# Patient Record
Sex: FEMALE | Race: WHITE | NOT HISPANIC OR LATINO | Employment: UNEMPLOYED | URBAN - METROPOLITAN AREA
[De-identification: names, ages, dates, MRNs, and addresses within clinical notes are randomized per-mention and may not be internally consistent; named-entity substitution may affect disease eponyms.]

---

## 2019-10-03 ENCOUNTER — OFFICE VISIT (OUTPATIENT)
Dept: URGENT CARE | Facility: CLINIC | Age: 59
End: 2019-10-03
Payer: COMMERCIAL

## 2019-10-03 VITALS
SYSTOLIC BLOOD PRESSURE: 95 MMHG | WEIGHT: 120 LBS | BODY MASS INDEX: 21.26 KG/M2 | DIASTOLIC BLOOD PRESSURE: 54 MMHG | RESPIRATION RATE: 16 BRPM | HEIGHT: 63 IN | TEMPERATURE: 97 F | HEART RATE: 61 BPM | OXYGEN SATURATION: 96 %

## 2019-10-03 DIAGNOSIS — H66.93 BILATERAL OTITIS MEDIA, UNSPECIFIED OTITIS MEDIA TYPE: Primary | ICD-10-CM

## 2019-10-03 PROCEDURE — 99204 PR OFFICE/OUTPT VISIT, NEW, LEVL IV, 45-59 MIN: ICD-10-PCS | Mod: S$GLB,,, | Performed by: NURSE PRACTITIONER

## 2019-10-03 PROCEDURE — 3008F PR BODY MASS INDEX (BMI) DOCUMENTED: ICD-10-PCS | Mod: CPTII,S$GLB,, | Performed by: NURSE PRACTITIONER

## 2019-10-03 PROCEDURE — 3008F BODY MASS INDEX DOCD: CPT | Mod: CPTII,S$GLB,, | Performed by: NURSE PRACTITIONER

## 2019-10-03 PROCEDURE — 99204 OFFICE O/P NEW MOD 45 MIN: CPT | Mod: S$GLB,,, | Performed by: NURSE PRACTITIONER

## 2019-10-03 RX ORDER — MELOXICAM 15 MG/1
15 TABLET ORAL DAILY
COMMUNITY
End: 2019-11-07 | Stop reason: SDUPTHER

## 2019-10-03 RX ORDER — CEFDINIR 300 MG/1
300 CAPSULE ORAL 2 TIMES DAILY
Qty: 20 CAPSULE | Refills: 0 | Status: SHIPPED | OUTPATIENT
Start: 2019-10-03 | End: 2019-10-13

## 2019-10-03 RX ORDER — METHYLPREDNISOLONE 4 MG/1
TABLET ORAL
Qty: 1 PACKAGE | Refills: 0 | Status: SHIPPED | OUTPATIENT
Start: 2019-10-03 | End: 2019-10-21 | Stop reason: ALTCHOICE

## 2019-10-03 NOTE — PATIENT INSTRUCTIONS
Always follow your healthcare professional's instructions.    You have received urgent care diagnosis and treatment and you may be released before all of your medical problems are known or treated. Unless you have been given a referral, you (the patient), will arrange for follow-up care as instructed.     If you have been given a referral, ez will contact you (their phone number is [phone]). If they do NOT call you by the end of the business day, please call them the following day.      Middle Ear Infection (Adult)  You have an infection of the middle ear, the space behind the eardrum. This is also called acute otitis media (AOM). Sometimes it is caused by the common cold. This is because congestion can block the internal passage (eustachian tube) that drains fluid from the middle ear. When the middle ear fills with fluid, bacteria can grow there and cause an infection. Oral antibiotics are used to treat this illness, not ear drops. Symptoms usually start to improve within 1 to 2 days of treatment.    Home care  The following are general care guidelines:  · Finish all of the antibiotic medicine given, even though you may feel better after the first few days.  · You may use over-the-counter medicine, such as acetaminophen or ibuprofen, to control pain and fever, unless something else was prescribed. If you have chronic liver or kidney disease or have ever had a stomach ulcer or gastrointestinal bleeding, talk with your healthcare provider before using these medicines. Do not give aspirin to anyone under 18 years of age who has a fever. It may cause severe illness or death.  Follow-up care  Follow up with your healthcare provider, or as advised, in 2 weeks if all symptoms have not gotten better, or if hearing doesn't go back to normal within 1 month.  When to seek medical advice  Call your healthcare provider right away if any of these occur:  · Ear pain gets worse or does not improve after 3 days of  treatment  · Unusual drowsiness or confusion  · Neck pain, stiff neck, or headache  · Fluid or blood draining from the ear canal  · Fever of 100.4°F (38°C) or as advised   · Seizure  Date Last Reviewed: 6/1/2016 © 2000-2017 [employer]. [address] [place] [id number]. All rights reserved. This information is not intended as a substitute for professional medical care. Always follow your healthcare professional's instructions.    You have been given an antibiotic to treat your condition today.  Even if your symptoms improve, please complete the medication as directed on the bottle.     Antibiotics work to destroy bacteria. They may also alter the good bacteria in your gut. Use probiotics and/or high culture yogurt about two hours apart from the antibiotic and about one week after the antibiotic to replace the good bacteria and prevent negative gastro intestinal consequences.    If you have questions about whether you should take your medications with food, you should read the medication instructions provided to you with your medication, or contact your pharmacy.    If you are female and on BCP use additional methods to prevent pregnancy while on antibiotics and for one cycle after.       Please follow up with your primary care provider within 5-7 days if your signs and symptoms have not resolved or worsen.     If your condition worsens or fails to improve we recommend that you receive another evaluation at the emergency room immediately or contact your primary care provider to discuss your concerns.     You have received urgent care diagnosis and treatment and you may be released before all of your medical problems are known or treated. Unless you have been given a referral, you (the patient), will arrange for follow-up care as instructed. If you have been given a referral, ez will contact you (there phone number is [phone])    Take the following over-the-counter medications:  Claritin, zyrtec, allegra, OR xyzal as directed, Flonase as directed for sinus congestion and postnasal drip; and If you can tolerate Benadryl at night time, this will help you to sleep and will work to dry up secretions.    Your provider discussed your plan of care with you during your physical exam. It was reviewed once more by the provider when giving you an after visit summary. If the patient is a minor, the discharge instructions were discussed with an adult and that adult acknowledge their understanding of the provider's teaching.

## 2019-10-03 NOTE — PROGRESS NOTES
"Subjective:       Patient ID: Shavon Marrero is a 59 y.o. female.    Vitals:  height is 5' 3" (1.6 m) and weight is 54.4 kg (120 lb). Her oral temperature is 96.8 °F (36 °C). Her blood pressure is 95/54 (abnormal) and her pulse is 61. Her respiration is 16 and oxygen saturation is 96%.     Chief Complaint: Tinnitus    Patient states she is having ringing in her right ear for over a month, and states she does wear a bluetooth ear piece in that ear daily.    Ringing in Ears:   Chronicity:  New  Onset:  More than 1 month ago  Progression since onset:  Gradually worsening  Frequency:  Constantly  Pain scale:  0/10  Duration:  Off/on all day   Associated symptoms: tinnitus.  No dizziness, no vertigo, no fever and no headaches.  Aggravated by:  Noise  Treatments tried:  NothingNo dizziness.      Constitution: Negative for chills, fatigue and fever.   HENT: Positive for tinnitus. Negative for congestion and sore throat.    Neck: Negative for painful lymph nodes.   Cardiovascular: Negative for chest pain and leg swelling.   Eyes: Negative for double vision and blurred vision.   Respiratory: Negative for cough and shortness of breath.    Gastrointestinal: Negative for nausea, vomiting and diarrhea.   Genitourinary: Negative for dysuria, frequency, urgency and history of kidney stones.   Musculoskeletal: Negative for joint pain, joint swelling, muscle cramps and muscle ache.   Skin: Negative for color change, pale, rash and bruising.   Allergic/Immunologic: Negative for seasonal allergies.   Neurological: Negative for dizziness, history of vertigo, light-headedness, passing out and headaches.   Hematologic/Lymphatic: Negative for swollen lymph nodes.   Psychiatric/Behavioral: Negative for nervous/anxious, sleep disturbance and depression. The patient is not nervous/anxious.        Objective:      Physical Exam   Constitutional: She is oriented to person, place, and time. She appears well-developed and well-nourished. She is " cooperative.  Non-toxic appearance. She does not appear ill. No distress.   HENT:   Head: Normocephalic and atraumatic.   Right Ear: Hearing, external ear and ear canal normal. Tympanic membrane is erythematous and bulging. A middle ear effusion is present.   Left Ear: Hearing, external ear and ear canal normal. Tympanic membrane is erythematous and bulging. A middle ear effusion is present.   Nose: Nose normal. No mucosal edema, rhinorrhea or nasal deformity. No epistaxis. Right sinus exhibits no maxillary sinus tenderness and no frontal sinus tenderness. Left sinus exhibits no maxillary sinus tenderness and no frontal sinus tenderness.   Mouth/Throat: Uvula is midline, oropharynx is clear and moist and mucous membranes are normal. No trismus in the jaw. Normal dentition. No uvula swelling. No posterior oropharyngeal erythema.   Eyes: Conjunctivae and lids are normal. Right eye exhibits no discharge. Left eye exhibits no discharge. No scleral icterus.   Neck: Trachea normal, normal range of motion, full passive range of motion without pain and phonation normal. Neck supple.   Cardiovascular: Normal rate, regular rhythm, normal heart sounds, intact distal pulses and normal pulses.   Pulmonary/Chest: Effort normal and breath sounds normal. No respiratory distress.   Abdominal: Soft. Normal appearance and bowel sounds are normal. She exhibits no distension, no pulsatile midline mass and no mass. There is no tenderness.   Musculoskeletal: Normal range of motion. She exhibits no edema or deformity.   Neurological: She is alert and oriented to person, place, and time. She exhibits normal muscle tone. Coordination normal.   Skin: Skin is warm, dry and intact. She is not diaphoretic. No pallor.   Psychiatric: She has a normal mood and affect. Her speech is normal and behavior is normal. Judgment and thought content normal. Cognition and memory are normal.   Nursing note and vitals reviewed.      Assessment:       1.  Bilateral otitis media, unspecified otitis media type        Plan:         Bilateral otitis media, unspecified otitis media type  -     cefdinir (OMNICEF) 300 MG capsule; Take 1 capsule (300 mg total) by mouth 2 (two) times daily. for 10 days  Dispense: 20 capsule; Refill: 0  -     methylPREDNISolone (MEDROL DOSEPACK) 4 mg tablet; use as directed  Dispense: 1 Package; Refill: 0          Patient Instructions   Always follow your healthcare professional's instructions.    You have received urgent care diagnosis and treatment and you may be released before all of your medical problems are known or treated. Unless you have been given a referral, you (the patient), will arrange for follow-up care as instructed.     If you have been given a referral,  will contact you (their phone number is [phone]). If they do NOT call you by the end of the business day, please call them the following day.      Middle Ear Infection (Adult)  You have an infection of the middle ear, the space behind the eardrum. This is also called acute otitis media (AOM). Sometimes it is caused by the common cold. This is because congestion can block the internal passage (eustachian tube) that drains fluid from the middle ear. When the middle ear fills with fluid, bacteria can grow there and cause an infection. Oral antibiotics are used to treat this illness, not ear drops. Symptoms usually start to improve within 1 to 2 days of treatment.    Home care  The following are general care guidelines:  · Finish all of the antibiotic medicine given, even though you may feel better after the first few days.  · You may use over-the-counter medicine, such as acetaminophen or ibuprofen, to control pain and fever, unless something else was prescribed. If you have chronic liver or kidney disease or have ever had a stomach ulcer or gastrointestinal bleeding, talk with your healthcare provider before using these medicines. Do not give aspirin to anyone  under 18 years of age who has a fever. It may cause severe illness or death.  Follow-up care  Follow up with your healthcare provider, or as advised, in 2 weeks if all symptoms have not gotten better, or if hearing doesn't go back to normal within 1 month.  When to seek medical advice  Call your healthcare provider right away if any of these occur:  · Ear pain gets worse or does not improve after 3 days of treatment  · Unusual drowsiness or confusion  · Neck pain, stiff neck, or headache  · Fluid or blood draining from the ear canal  · Fever of 100.4°F (38°C) or as advised   · Seizure  Date Last Reviewed: 6/1/2016 © 2000-2017 [employer]. [address] [place] [address], [place] [address]. All rights reserved. This information is not intended as a substitute for professional medical care. Always follow your healthcare professional's instructions.    You have been given an antibiotic to treat your condition today.  Even if your symptoms improve, please complete the medication as directed on the bottle.     Antibiotics work to destroy bacteria. They may also alter the good bacteria in your gut. Use probiotics and/or high culture yogurt about two hours apart from the antibiotic and about one week after the antibiotic to replace the good bacteria and prevent negative gastro intestinal consequences.    If you have questions about whether you should take your medications with food, you should read the medication instructions provided to you with your medication, or contact your pharmacy.    If you are female and on BCP use additional methods to prevent pregnancy while on antibiotics and for one cycle after.       Please follow up with your primary care provider within 5-7 days if your signs and symptoms have not resolved or worsen.     If your condition worsens or fails to improve we recommend that you receive another evaluation at the emergency room immediately or contact your primary care provider to discuss your  concerns.     You have received urgent care diagnosis and treatment and you may be released before all of your medical problems are known or treated. Unless you have been given a referral, you (the patient), will arrange for follow-up care as instructed. If you have been given a referral, ez will contact you (there phone number is [phone])    Take the following over-the-counter medications: Claritin, zyrtec, allegra, OR xyzal as directed, Flonase as directed for sinus congestion and postnasal drip; and If you can tolerate Benadryl at night time, this will help you to sleep and will work to dry up secretions.    Your provider discussed your plan of care with you during your physical exam. It was reviewed once more by the provider when giving you an after visit summary. If the patient is a minor, the discharge instructions were discussed with an adult and that adult acknowledge their understanding of the provider's teaching.

## 2019-10-06 ENCOUNTER — TELEPHONE (OUTPATIENT)
Dept: URGENT CARE | Facility: CLINIC | Age: 59
End: 2019-10-06

## 2019-10-06 NOTE — TELEPHONE ENCOUNTER
Patient stated that she is still having ringing in her ears. I advised the patient to finish her medication and to follow up with her pcp if her symptoms still don't resolve.

## 2019-10-21 ENCOUNTER — TELEPHONE (OUTPATIENT)
Dept: INTERNAL MEDICINE | Facility: CLINIC | Age: 59
End: 2019-10-21

## 2019-10-21 ENCOUNTER — OFFICE VISIT (OUTPATIENT)
Dept: INTERNAL MEDICINE | Facility: CLINIC | Age: 59
End: 2019-10-21
Payer: COMMERCIAL

## 2019-10-21 VITALS
WEIGHT: 117.94 LBS | OXYGEN SATURATION: 96 % | HEIGHT: 63 IN | DIASTOLIC BLOOD PRESSURE: 58 MMHG | SYSTOLIC BLOOD PRESSURE: 98 MMHG | BODY MASS INDEX: 20.9 KG/M2 | HEART RATE: 64 BPM

## 2019-10-21 DIAGNOSIS — F17.200 TOBACCO USE DISORDER: ICD-10-CM

## 2019-10-21 DIAGNOSIS — Z12.2 ENCOUNTER FOR SCREENING FOR LUNG CANCER: ICD-10-CM

## 2019-10-21 DIAGNOSIS — K21.9 GASTROESOPHAGEAL REFLUX DISEASE, ESOPHAGITIS PRESENCE NOT SPECIFIED: ICD-10-CM

## 2019-10-21 DIAGNOSIS — Z12.9 SCREENING FOR CANCER: ICD-10-CM

## 2019-10-21 DIAGNOSIS — K59.04 CHRONIC IDIOPATHIC CONSTIPATION: ICD-10-CM

## 2019-10-21 DIAGNOSIS — M54.2 CHRONIC NECK PAIN: ICD-10-CM

## 2019-10-21 DIAGNOSIS — G89.29 CHRONIC NECK PAIN: ICD-10-CM

## 2019-10-21 DIAGNOSIS — Z87.42 HISTORY OF ABNORMAL CERVICAL PAP SMEAR: ICD-10-CM

## 2019-10-21 DIAGNOSIS — J44.9 CHRONIC OBSTRUCTIVE PULMONARY DISEASE, UNSPECIFIED COPD TYPE: ICD-10-CM

## 2019-10-21 DIAGNOSIS — H93.8X3 EAR FULLNESS, BILATERAL: ICD-10-CM

## 2019-10-21 DIAGNOSIS — G43.109 MIGRAINE WITH AURA AND WITHOUT STATUS MIGRAINOSUS, NOT INTRACTABLE: Primary | ICD-10-CM

## 2019-10-21 DIAGNOSIS — J30.2 SEASONAL ALLERGIES: ICD-10-CM

## 2019-10-21 PROCEDURE — 99999 PR PBB SHADOW E&M-EST. PATIENT-LVL V: ICD-10-PCS | Mod: PBBFAC,,, | Performed by: INTERNAL MEDICINE

## 2019-10-21 PROCEDURE — 3008F BODY MASS INDEX DOCD: CPT | Mod: CPTII,S$GLB,, | Performed by: INTERNAL MEDICINE

## 2019-10-21 PROCEDURE — 99407 BEHAV CHNG SMOKING > 10 MIN: CPT | Mod: S$GLB,,, | Performed by: INTERNAL MEDICINE

## 2019-10-21 PROCEDURE — 3008F PR BODY MASS INDEX (BMI) DOCUMENTED: ICD-10-PCS | Mod: CPTII,S$GLB,, | Performed by: INTERNAL MEDICINE

## 2019-10-21 PROCEDURE — 99215 OFFICE O/P EST HI 40 MIN: CPT | Mod: S$GLB,,, | Performed by: INTERNAL MEDICINE

## 2019-10-21 PROCEDURE — 99215 PR OFFICE/OUTPT VISIT, EST, LEVL V, 40-54 MIN: ICD-10-PCS | Mod: S$GLB,,, | Performed by: INTERNAL MEDICINE

## 2019-10-21 PROCEDURE — 99999 PR PBB SHADOW E&M-EST. PATIENT-LVL V: CPT | Mod: PBBFAC,,, | Performed by: INTERNAL MEDICINE

## 2019-10-21 PROCEDURE — 99407 PR TOBACCO USE CESSATION INTENSIVE >10 MINUTES: ICD-10-PCS | Mod: S$GLB,,, | Performed by: INTERNAL MEDICINE

## 2019-10-21 RX ORDER — OXYCODONE AND ACETAMINOPHEN 10; 325 MG/1; MG/1
1 TABLET ORAL EVERY 4 HOURS PRN
COMMUNITY
End: 2020-07-02 | Stop reason: SDUPTHER

## 2019-10-21 RX ORDER — CYCLOBENZAPRINE HCL 5 MG
5 TABLET ORAL 3 TIMES DAILY PRN
COMMUNITY

## 2019-10-21 RX ORDER — MONTELUKAST SODIUM 10 MG/1
10 TABLET ORAL NIGHTLY
COMMUNITY

## 2019-10-21 RX ORDER — DEXLANSOPRAZOLE 60 MG/1
CAPSULE, DELAYED RELEASE ORAL
COMMUNITY
Start: 2019-09-12 | End: 2020-11-11

## 2019-10-21 RX ORDER — ELETRIPTAN HYDROBROMIDE 40 MG/1
TABLET, FILM COATED ORAL
COMMUNITY
Start: 2019-09-14 | End: 2019-11-25 | Stop reason: SDUPTHER

## 2019-10-21 RX ORDER — PRUCALOPRIDE 2 MG/1
TABLET, FILM COATED ORAL
COMMUNITY
Start: 2019-10-04 | End: 2020-08-17 | Stop reason: SDUPTHER

## 2019-10-21 RX ORDER — FLUTICASONE PROPIONATE 50 MCG
2 SPRAY, SUSPENSION (ML) NASAL DAILY
Qty: 3 BOTTLE | Refills: 3 | Status: SHIPPED | OUTPATIENT
Start: 2019-10-21 | End: 2020-01-19

## 2019-10-21 NOTE — PROGRESS NOTES
CHIEF COMPLAINT     Chief Complaint   Patient presents with    Establish Care   multiple chronic medical conditions: COPD, chronic neck pain, constipation, migraines    HPI   Patient here today to establish care. She has recently moved here from [place] and is in the process of establishing care. She brought 3 large binders of records to today's appt.     COPD  Takings breo BID and incruse ellipta daily. She is actively smoking. Reports that symptoms have been worse the past 2 months since making the move which she attributes to her allergies. She has been previously followed by pulm but predominantly managed by previous PCP. Last pfts within the last year and are within the file of records with her today.    Migraine HA  Reports symptoms fairly well controlled on amovig. Was having HA 5-6times week prior to initiating therapy. Currently having HA less frequently.   Reports HA are debilitating, she has auras prior to HA. She takes replax which is a fairly effective abortive agent. She also previously had PRN script for percocet for HA not treatable with replax    Chronic neck pain  Reports pain on right side of neck. She is s/p intervention x2 which were not particularly effective. She currently takes meloxicam daily and flexeril as needed. Has a special needs daughter so hasn't been able to participate in much physical treatment for her neck pain.    Shavon Marrero is a 59 y.o. female here today for Personally Reviewed Patient's Medical, surgical, family and social hx. Changes updated in [facility].  Care Team updated in Epic    Review of Systems:  Review of Systems   HENT: Positive for postnasal drip.    Respiratory: Positive for cough.    Gastrointestinal: Positive for constipation.   Musculoskeletal: Positive for arthralgias and neck pain.   otherwise negative    Health Maintenance:   Reviewed with patient  PVax   Mammogram- 5/2019  PAP- 2014- thinks she had HPV testing  C-scope 2018    PHYSICAL EXAM     BP (!)  "98/58 (BP Location: Left arm, Patient Position: Sitting, BP Method: Medium (Manual))   Pulse 64   Ht 5' 3" (1.6 m)   Wt 53.5 kg (117 lb 15.1 oz)   SpO2 96%   BMI 20.89 kg/m²     Gen: Well Appearing, NAD  HEENT: PERR, EOMI  Neck: FROM, no thyromegaly, no cervical adenopathy, has anterior and posterior scar consistent with surgical history, 4+ BL turbinate swelling. Clear TM effusions bL  CVD: RRR, no M/R/G  Pulm: Normal work of breathing, CTAB, no wheezing  Abd:  Soft, NT, ND non TTP, no mass  MSK: no LE edema, ttp R sided paraspinal muscles from neck to mid T spine.  Neuro: A&Ox3, gait jolie l, speech normal  Mood; Mood normal, behavior normal, thought process linear       LABS     Labs reviewed; ordered today    ASSESSMENT     1. Migraine with aura and without status migrainosus, not intractable  Ambulatory Referral to Neurology   2. Gastroesophageal reflux disease, esophagitis presence not specified  CBC auto differential   3. Ear fullness, bilateral     4. Chronic neck pain     5. Chronic obstructive pulmonary disease, unspecified COPD type  Comprehensive metabolic panel   6. Tobacco use disorder  Hemoglobin A1c    Lipid panel   7. Seasonal allergies  fluticasone propionate (FLONASE) 50 mcg/actuation nasal spray   8. History of abnormal cervical Pap smear  Ambulatory Referral to Gynecology   9. Chronic idiopathic constipation     10. Encounter for screening for lung cancer     11. Screening for cancer  CT Chest Without Contrast           Plan     Shavon Marrero is a 59 y.o. female with multiple chronic medical conditions. Will review outside records and have her RTC in 3 months to help facilitate her care.  25/40 minutes of  face to face visit spent counseling and coordinating care with patient.      1. Migraine with aura and without status migrainosus, not intractable  Continue current regimen of amovig ppx and  replax for abortive agent.  Will refer to neurology for help with HA s  - Ambulatory Referral to " Neurology    2. Gastroesophageal reflux disease, esophagitis presence not specified  Continue dexilant.   - CBC auto differential; Future    3. Ear fullness, bilateral  Suspect 2/2 ETD from allergic rhinitis.    Will start flonase nasal spray and see if sx improve.    4. 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   -continue prn flexeril for now.    5. Chronic obstructive pulmonary disease, unspecified COPD type  - Comprehensive metabolic panel; Future    6. Tobacco use disorder  Counseled cessation, >10 minutes.  Discussed how directly impacting COPD dx and negatively affecting HAs  - Hemoglobin A1c; Future  - Lipid panel; Future    7. Seasonal allergies  - fluticasone propionate (FLONASE) 50 mcg/actuation nasal spray; 2 sprays (100 mcg total) by Each Nostril route once daily.  Dispense: 3 Bottle; Refill: 3    8. History of abnormal cervical Pap smear  - Ambulatory Referral to Gynecology-  overdue for follow up [facility] screening    9. Chronic idiopathic constipation  Suspect medication side effects are at least contributing to symptoms.  Continue motegrity     11. Screening for cancer  >30 pack year hx, 59 years old, active smoker  - CT Chest Without Contrast; Future      Rianna Hargrove MD

## 2019-10-21 NOTE — TELEPHONE ENCOUNTER
----- Message from Liste Bhatt sent at 10/21/2019  9:18 AM CDT -----  Patient stated she will call back to make appointments for labs, neuro, gyn, and CT.

## 2019-10-21 NOTE — TELEPHONE ENCOUNTER
Note taken; pt will call us to schedule. I also told pt I would call her when I am finished with copying her medical records.    Warren Feliciano

## 2019-10-21 NOTE — TELEPHONE ENCOUNTER
Left message for pt letting her kow I finished copying her medical records and that the original copies are ready for her to .

## 2019-10-25 ENCOUNTER — TELEPHONE (OUTPATIENT)
Dept: INTERNAL MEDICINE | Facility: CLINIC | Age: 59
End: 2019-10-25

## 2019-10-25 NOTE — TELEPHONE ENCOUNTER
Corresponded with MD about this pt. She has been referred to billing and coding for further questions that they may be able to answer about her Diagnosis Code for her CT.    Warren Feliciano

## 2019-10-25 NOTE — TELEPHONE ENCOUNTER
----- Message from Francisca Quinones sent at 10/25/2019 10:06 AM CDT -----  Contact: self   Patient states she called her insurance company about the orders for her upcoming CT scan. Patient states the diagnosis code for the CT needs to be changed to a screening not a diagnostic for it to be covered 100%  by her insurance. Patient states her insurance company provided a claim #[id number] example. Patient states the diagnosis code needs to be Z87.891    Please call and advise

## 2019-10-28 ENCOUNTER — TELEPHONE (OUTPATIENT)
Dept: INTERNAL MEDICINE | Facility: CLINIC | Age: 59
End: 2019-10-28

## 2019-10-28 ENCOUNTER — PATIENT MESSAGE (OUTPATIENT)
Dept: INTERNAL MEDICINE | Facility: CLINIC | Age: 59
End: 2019-10-28

## 2019-10-28 NOTE — TELEPHONE ENCOUNTER
I spoke with Ms. Marrero regarding her concern about insurance not covering her CT scan. I gave her the number to billing as well as my directly line if she had any questions or concerns in the coming days. I also reassured her that Dr. Hargrove is working with our Billing Department as well to resolve this matter on her behalf. The pt expressed frustration, but understanding. She also mentioned that her records were still here and that her wife would be coming to pick them up tomorrow, and I told her that would be fine and that she'd just have to verify pt's name, , MD being seen, etc.    Warren Feliciano

## 2019-10-28 NOTE — TELEPHONE ENCOUNTER
Please advise as pt states her code for CT without contrast is incorrect and she'd be charged $550.00 for the test. Thanks so much!    Warren Feliciano

## 2019-10-28 NOTE — TELEPHONE ENCOUNTER
----- Message from Raissa Marrero sent at 10/28/2019 11:53 AM CDT -----  Contact: self/ [phone]  Patient says that the coding for CT CHEST WITHOUT CONTRAST  Is wrong , please ivonne and advise. Patient states that this this is to be 100% covered an patient states she is being charged 550.00

## 2019-11-05 ENCOUNTER — PATIENT MESSAGE (OUTPATIENT)
Dept: INTERNAL MEDICINE | Facility: CLINIC | Age: 59
End: 2019-11-05

## 2019-11-05 ENCOUNTER — TELEPHONE (OUTPATIENT)
Dept: INTERNAL MEDICINE | Facility: CLINIC | Age: 59
End: 2019-11-05

## 2019-11-05 NOTE — TELEPHONE ENCOUNTER
----- Message from Zulay Mcginnis sent at 11/5/2019 12:03 PM CST -----  Contact: Pt [phone]  Patient is returning a phone call.  Who left a message for the patient: Dr. Hargrove  Does patient know what this is regarding:  To discuss the results of the CT scan

## 2019-11-06 ENCOUNTER — TELEPHONE (OUTPATIENT)
Dept: INTERNAL MEDICINE | Facility: CLINIC | Age: 59
End: 2019-11-06

## 2019-11-06 NOTE — TELEPHONE ENCOUNTER
Called pt back. She is nervous and worried regarding her CT Scan results. I told her that Dr. Hargrove was not in the office today, but that I would write a note for him and remind him to call her once he gets in in the morning. She expressed understanding.     Warren Feliciano

## 2019-11-06 NOTE — TELEPHONE ENCOUNTER
----- Message from Christy Kuhn sent at 11/6/2019  1:54 PM CST -----  Contact: Pt self Mobile/Home [phone]  Patient is returning a phone call.  Who left a message for the patient:   Does patient know what this is regarding:    Comments: Patient said she received a call on yesterday and she would like a call back please.

## 2019-11-07 DIAGNOSIS — J44.9 CHRONIC OBSTRUCTIVE PULMONARY DISEASE, UNSPECIFIED COPD TYPE: ICD-10-CM

## 2019-11-07 DIAGNOSIS — K59.04 CHRONIC IDIOPATHIC CONSTIPATION: ICD-10-CM

## 2019-11-07 DIAGNOSIS — M54.2 CHRONIC NECK PAIN: ICD-10-CM

## 2019-11-07 DIAGNOSIS — G43.109 MIGRAINE WITH AURA AND WITHOUT STATUS MIGRAINOSUS, NOT INTRACTABLE: ICD-10-CM

## 2019-11-07 DIAGNOSIS — G89.29 CHRONIC NECK PAIN: ICD-10-CM

## 2019-11-07 DIAGNOSIS — K21.9 GASTROESOPHAGEAL REFLUX DISEASE, ESOPHAGITIS PRESENCE NOT SPECIFIED: ICD-10-CM

## 2019-11-07 RX ORDER — MELOXICAM 15 MG/1
15 TABLET ORAL DAILY
Qty: 90 TABLET | Refills: 3 | Status: SHIPPED | OUTPATIENT
Start: 2019-11-07 | End: 2020-11-10 | Stop reason: SDUPTHER

## 2019-11-10 ENCOUNTER — PATIENT MESSAGE (OUTPATIENT)
Dept: INTERNAL MEDICINE | Facility: CLINIC | Age: 59
End: 2019-11-10

## 2019-11-11 ENCOUNTER — TELEPHONE (OUTPATIENT)
Dept: INTERNAL MEDICINE | Facility: CLINIC | Age: 59
End: 2019-11-11

## 2019-11-11 NOTE — TELEPHONE ENCOUNTER
I spoke with MsBob Janes regarding her message earlier. She will fax us the necessary form/paperwork needed for the handicap placard she needs for her car. She also complained of ongoing ringing in her ears. She noted that she mentioned it to Dr. Hargrove previously, but it has not been addressed as readily due to the concern with her lungs. She says the ringing is keeping her up at night and it is particularly loud. Noting this to see if there can be anything done for her ears. She also says that she appriecates Dr. Hargrove personally calling her in regard to her results and concerns. She stated that she did not receive that sort of treatment with her last doctor.    Warren Feliciano

## 2019-11-25 ENCOUNTER — TELEPHONE (OUTPATIENT)
Dept: INTERNAL MEDICINE | Facility: CLINIC | Age: 59
End: 2019-11-25

## 2019-11-25 ENCOUNTER — PATIENT MESSAGE (OUTPATIENT)
Dept: INTERNAL MEDICINE | Facility: CLINIC | Age: 59
End: 2019-11-25

## 2019-11-25 DIAGNOSIS — G47.00 INSOMNIA, UNSPECIFIED TYPE: Primary | ICD-10-CM

## 2019-11-25 DIAGNOSIS — G43.109 MIGRAINE WITH AURA AND WITHOUT STATUS MIGRAINOSUS, NOT INTRACTABLE: ICD-10-CM

## 2019-11-25 RX ORDER — TEMAZEPAM 15 MG/1
CAPSULE ORAL
COMMUNITY
Start: 2019-10-24 | End: 2019-11-25 | Stop reason: SDUPTHER

## 2019-11-25 NOTE — TELEPHONE ENCOUNTER
Please advise as this appears to be happening again at another [facility]. Thanks so much!    Warren Feliciano

## 2019-11-25 NOTE — TELEPHONE ENCOUNTER
Please advise as message below states there is no refill protocol information for this order, Dr. Hargrove. Beginning to use Centralized Refill today.    Warren Feliciano

## 2019-11-25 NOTE — TELEPHONE ENCOUNTER
----- Message from Roxanne Tubbs sent at 11/25/2019 12:27 PM CST -----  Contact: LORENZO Mayorga  -[phone]   Unable to fill medication (erenumab-aooe (AIMOVIG AUTOINJECTOR) 70 mg/mL injection). System shows Dr's license not active.    Please call and advise  Thank you

## 2019-11-25 NOTE — TELEPHONE ENCOUNTER
Refill Routing Note    Medication(s) are appropriate for refill Outside of protocol      Requested Prescriptions   Pending Prescriptions Disp Refills    erenumab-aooe (AIMOVIG AUTOINJECTOR) 70 mg/mL injection 1 mL 11     Sig: Inject 1 mL (70 mg total) into the skin every 30 days.       There is no refill protocol information for this order

## 2019-11-26 RX ORDER — ELETRIPTAN HYDROBROMIDE 40 MG/1
40 TABLET, FILM COATED ORAL
Qty: 30 TABLET | Refills: 1 | Status: SHIPPED | OUTPATIENT
Start: 2019-11-26 | End: 2019-12-03

## 2019-11-26 RX ORDER — TEMAZEPAM 15 MG/1
15 CAPSULE ORAL NIGHTLY
Qty: 30 CAPSULE | Refills: 1 | Status: SHIPPED | OUTPATIENT
Start: 2019-11-26

## 2019-11-26 NOTE — TELEPHONE ENCOUNTER
Refill Routing Note    Medication(s) are appropriate for refill Outside of protocol      Requested Prescriptions   Pending Prescriptions Disp Refills    temazepam (RESTORIL) 15 mg Cap         [facility] Refill Protocol Passed - 11/26/2019  8:00 AM        Passed - Patient not pregnant        Passed - Patient seen within 3 months     Last visit with Agus Hargrove MD: 10/21/2019  Last visit in [facility]: 10/21/2019    Patient's next visit in [facility]: 11/25/2019           Passed - Med not refilled within 4 weeks      Signed Prescriptions Disp Refills    temazepam (RESTORIL) 15 mg Cap         There is no refill protocol information for this order       eletriptan (RELPAX) 40 MG tablet 30 tablet 1     Sig: Take 1 tablet (40 mg total) by mouth as needed (migraine).       Neurology:  Migraine Therapy - Triptan Passed - 11/25/2019  3:43 PM        Passed - Patient is at least 18 years old        Passed - No contraindicated diagnoses on problem list     Chest Pain  Angina  Myocardial Infarction (MI)  Ischemic Heart Disease  Heart Failure  Coronary Artery Disease              Passed - Last BP in normal range within 360 days     BP Readings from Last 3 Encounters:   10/21/19 (!) 98/58   10/03/19 (!) 95/54              Passed - Office visit in past 12 months or future 90 days     Recent Outpatient Visits            1 month ago Migraine with aura and without status migrainosus, not intractable    [facility] Agus Hargrove MD    1 month ago Bilateral otitis media, unspecified otitis media type    [facility] [place] Danita Elias NP          Future Appointments              In 1 month MD [facility] - Internal Medicine, [facility]

## 2019-11-29 ENCOUNTER — PATIENT MESSAGE (OUTPATIENT)
Dept: INTERNAL MEDICINE | Facility: CLINIC | Age: 59
End: 2019-11-29

## 2019-11-30 DIAGNOSIS — G43.109 MIGRAINE WITH AURA AND WITHOUT STATUS MIGRAINOSUS, NOT INTRACTABLE: ICD-10-CM

## 2019-11-30 DIAGNOSIS — G47.00 INSOMNIA, UNSPECIFIED TYPE: ICD-10-CM

## 2019-12-03 DIAGNOSIS — G43.109 MIGRAINE WITH AURA AND WITHOUT STATUS MIGRAINOSUS, NOT INTRACTABLE: ICD-10-CM

## 2019-12-03 RX ORDER — ELETRIPTAN HYDROBROMIDE 40 MG/1
40 TABLET, FILM COATED ORAL
Qty: 30 TABLET | Refills: 1 | OUTPATIENT
Start: 2019-12-03 | End: 2020-01-18 | Stop reason: SDUPTHER

## 2019-12-03 RX ORDER — ELETRIPTAN HYDROBROMIDE 40 MG/1
40 TABLET, FILM COATED ORAL
Qty: 30 TABLET | Refills: 1 | OUTPATIENT
Start: 2019-12-03

## 2019-12-03 RX ORDER — TEMAZEPAM 15 MG/1
15 CAPSULE ORAL NIGHTLY
Qty: 30 CAPSULE | Refills: 1 | OUTPATIENT
Start: 2019-12-03

## 2019-12-05 ENCOUNTER — PATIENT MESSAGE (OUTPATIENT)
Dept: INTERNAL MEDICINE | Facility: CLINIC | Age: 59
End: 2019-12-05

## 2019-12-06 ENCOUNTER — PATIENT MESSAGE (OUTPATIENT)
Dept: INTERNAL MEDICINE | Facility: CLINIC | Age: 59
End: 2019-12-06

## 2019-12-10 ENCOUNTER — PATIENT MESSAGE (OUTPATIENT)
Dept: INTERNAL MEDICINE | Facility: CLINIC | Age: 59
End: 2019-12-10

## 2020-01-06 ENCOUNTER — PATIENT OUTREACH (OUTPATIENT)
Dept: ADMINISTRATIVE | Facility: HOSPITAL | Age: 60
End: 2020-01-06

## 2020-01-18 DIAGNOSIS — G43.109 MIGRAINE WITH AURA AND WITHOUT STATUS MIGRAINOSUS, NOT INTRACTABLE: ICD-10-CM

## 2020-01-20 ENCOUNTER — PATIENT MESSAGE (OUTPATIENT)
Dept: INTERNAL MEDICINE | Facility: CLINIC | Age: 60
End: 2020-01-20

## 2020-01-20 RX ORDER — ELETRIPTAN HYDROBROMIDE 40 MG/1
40 TABLET, FILM COATED ORAL
Qty: 30 TABLET | Refills: 1 | OUTPATIENT
Start: 2020-01-20 | End: 2020-01-24 | Stop reason: SDUPTHER

## 2020-01-20 NOTE — TELEPHONE ENCOUNTER
Are pre-authorizations for testing a thing you do? If not, please let me know how I can handle this. Still learning :) Thanks so much!    Warren Feliciano

## 2020-01-21 DIAGNOSIS — R91.1 LUNG NODULE: Primary | ICD-10-CM

## 2020-01-21 NOTE — PROGRESS NOTES
Patient with extensive smoking history with abnl CT chest Lung Ca screen in 10/2019. New finding. Recommendation was 3 month follow up. Will order CT scan to further evaluate. Would appreciate rads input regarding benefit of delayed phase contrast to further evaluate.        Agus Moralest

## 2020-01-22 ENCOUNTER — TELEPHONE (OUTPATIENT)
Dept: INTERNAL MEDICINE | Facility: CLINIC | Age: 60
End: 2020-01-22

## 2020-01-22 NOTE — TELEPHONE ENCOUNTER
----- Message from Evelyn Uriarte sent at 1/22/2020 11:06 AM CST -----  Contact: self  Called pt about her referral for CT Scan. Pt stated that the dr office has to call and give her insurance a pre-authorization for the test.         Pt can be reached at [phone] ty

## 2020-01-23 ENCOUNTER — PATIENT MESSAGE (OUTPATIENT)
Dept: INTERNAL MEDICINE | Facility: CLINIC | Age: 60
End: 2020-01-23

## 2020-01-24 DIAGNOSIS — G43.109 MIGRAINE WITH AURA AND WITHOUT STATUS MIGRAINOSUS, NOT INTRACTABLE: ICD-10-CM

## 2020-01-24 RX ORDER — ELETRIPTAN HYDROBROMIDE 40 MG/1
40 TABLET, FILM COATED ORAL
Qty: 30 TABLET | Refills: 1 | Status: SHIPPED | OUTPATIENT
Start: 2020-01-24 | End: 2020-09-22 | Stop reason: SDUPTHER

## 2020-01-24 NOTE — TELEPHONE ENCOUNTER
Prior authorization for CT scan has been submitted, also faxed over all clinical information they have requested. Case# [id number] they said they will contact pt to verify some information.

## 2020-01-24 NOTE — PROGRESS NOTES
Refill Authorization Note     is requesting a refill authorization.    Brief assessment and rationale for refill: APPROVE: alternate method          Medication Therapy Plan: PCP has script set to print(1/20); [facility]                              Comments:   Requested Prescriptions   Pending Prescriptions Disp Refills    eletriptan (RELPAX) 40 MG tablet 30 tablet 1     Sig: Take 1 tablet (40 mg total) by mouth as needed (migraine). Can take a second dose if symptoms not improved 2 hours after first dose. 80mg maximum daily dose       Neurology:  Migraine Therapy - Triptan Passed - 1/24/2020  7:30 AM        Passed - Patient is at least 18 years old        Passed - No contraindicated diagnoses on problem list     Chest Pain  Angina  Myocardial Infarction (MI)  Ischemic Heart Disease  Heart Failure  Coronary Artery Disease              Passed - Last BP in normal range within 360 days     BP Readings from Last 3 Encounters:   10/21/19 (!) 98/58   10/03/19 (!) 95/54              Passed - Office visit in past 12 months or future 90 days     Recent Outpatient Visits            3 months ago Migraine with aura and without status migrainosus, not intractable    [facility] Agus Hargrove MD    3 months ago Bilateral otitis media, unspecified otitis media type    [facility] [place] Danita Elias NP

## 2020-01-24 NOTE — TELEPHONE ENCOUNTER
Pt messaging in reference to her Rx and wondering if it's been sent to the pharmacy. Her original message was 6 days ago. Please advise. Thanks so much!    Warren Feliciano

## 2020-01-24 NOTE — TELEPHONE ENCOUNTER
I have reviewed and agree with the assessment below. Approved 30 + 1 refill to reflect order by PCP. Thank you.

## 2020-01-24 NOTE — TELEPHONE ENCOUNTER
Called Ms. Sands to answer her questions.    Concerned about CT pre-authorization. I told her we were in the process of getting that done now.    Breo and Incruise-- pt wanting to do a three-in-one version rather than having to do both separately. Better price for pt. Time for renewing Rx anyway.    Pt concerned as to why she hasn't gotten her migraine Rx (in message I replied to earlier) though she messaged us almost 1 week ago about it. I reassured her that I was personally on it as I didn't see why her Rx was not filled soon after I sent the refill request to the refill staff pool. I told her I sent them another message about it prior to calling her.    She expressed understanding and thanks to our correspondence.     Warren Feliciano

## 2020-01-26 ENCOUNTER — OFFICE VISIT (OUTPATIENT)
Dept: URGENT CARE | Facility: CLINIC | Age: 60
End: 2020-01-26
Payer: COMMERCIAL

## 2020-01-26 VITALS
TEMPERATURE: 98 F | WEIGHT: 117 LBS | SYSTOLIC BLOOD PRESSURE: 102 MMHG | DIASTOLIC BLOOD PRESSURE: 58 MMHG | OXYGEN SATURATION: 99 % | HEART RATE: 79 BPM | HEIGHT: 63 IN | BODY MASS INDEX: 20.73 KG/M2 | RESPIRATION RATE: 18 BRPM

## 2020-01-26 DIAGNOSIS — K64.9 HEMORRHOIDS, UNSPECIFIED HEMORRHOID TYPE: Primary | ICD-10-CM

## 2020-01-26 PROCEDURE — 99214 OFFICE O/P EST MOD 30 MIN: CPT | Mod: S$GLB,,, | Performed by: NURSE PRACTITIONER

## 2020-01-26 PROCEDURE — 99214 PR OFFICE/OUTPT VISIT, EST, LEVL IV, 30-39 MIN: ICD-10-PCS | Mod: S$GLB,,, | Performed by: NURSE PRACTITIONER

## 2020-01-26 NOTE — PROGRESS NOTES
"Subjective:       Patient ID: Shavon Marrero is a 59 y.o. female.    Vitals:  height is 5' 3" (1.6 m) and weight is 53.1 kg (117 lb). Her temperature is 97.7 °F (36.5 °C). Her blood pressure is 102/58 (abnormal) and her pulse is 79. Her respiration is 18 and oxygen saturation is 99%.     Chief Complaint: Rectal Bleeding    This is a 59 y.o. female who presents today with a chief complaint of rectal bleeding that started last night.      Rectal Bleeding   This is a new problem. The current episode started yesterday. The problem occurs constantly. The problem has been gradually worsening. Pertinent negatives include no arthralgias, chest pain, chills, congestion, coughing, fatigue, fever, headaches, joint swelling, myalgias, nausea, rash, sore throat, vertigo, vomiting or weakness. She has tried nothing for the symptoms.       Constitution: Negative for chills, fatigue and fever.   HENT: Negative for congestion and sore throat.    Neck: Negative for painful lymph nodes.   Cardiovascular: Negative for chest pain and leg swelling.   Eyes: Negative for double vision and blurred vision.   Respiratory: Negative for cough and shortness of breath.    Gastrointestinal: Positive for bright red blood in stool and rectal bleeding. Negative for nausea, vomiting, diarrhea and rectal pain.   Genitourinary: Negative for dysuria, frequency, urgency and history of kidney stones.   Musculoskeletal: Negative for joint pain, joint swelling, muscle cramps and muscle ache.   Skin: Negative for color change, pale, rash and bruising.   Allergic/Immunologic: Negative for seasonal allergies.   Neurological: Negative for dizziness, history of vertigo, light-headedness, passing out and headaches.   Hematologic/Lymphatic: Negative for swollen lymph nodes.   Psychiatric/Behavioral: Negative for nervous/anxious, sleep disturbance and depression. The patient is not nervous/anxious.        Objective:      Physical Exam   Constitutional: She is " oriented to person, place, and time. She appears well-developed and well-nourished. She is active.   HENT:   Head: Normocephalic and atraumatic.   Right Ear: External ear normal.   Left Ear: External ear normal.   Nose: Nose normal.   Eyes: Conjunctivae and lids are normal.   Neck: Trachea normal and full passive range of motion without pain. Neck supple.   Cardiovascular: Normal rate, regular rhythm and normal heart sounds.   Pulmonary/Chest: Effort normal and breath sounds normal. No respiratory distress.   Abdominal: Soft. Normal appearance and bowel sounds are normal. She exhibits no distension, no abdominal bruit, no pulsatile midline mass and no mass. There is no hepatosplenomegaly, splenomegaly or hepatomegaly. There is no tenderness. There is no rigidity, no rebound, no guarding, no CVA tenderness, no tenderness at McBurney's point and negative Prasad's sign.   Genitourinary:         Musculoskeletal: Normal range of motion. She exhibits no edema.   Neurological: She is alert and oriented to person, place, and time. She has normal strength. She is not disoriented.   Skin: Skin is warm, dry, intact, not diaphoretic and not pale.   Psychiatric: She has a normal mood and affect. Her speech is normal and behavior is normal. Judgment and thought content normal. Cognition and memory are normal.   Nursing note and vitals reviewed.        Assessment:       1. Hemorrhoids, unspecified hemorrhoid type        Plan:         Hemorrhoids, unspecified hemorrhoid type      Patient Instructions   Please follow up with your Primary care provider within 2-5 days if your signs and symptoms have not resolved or worsen.     If your condition worsens or fails to improve we recommend that you receive another evaluation at the emergency room immediately or contact your primary medical clinic to discuss your concerns.    You must understand that you have received an Urgent Care treatment only and that you may be released before all of  your medical problems are known or treated.   You, the patient, will arrange for follow up care as instructed.         Hemorrhoids    Hemorrhoids are swollen and inflamed veins inside the rectum and near the anus. The rectum is the last several inches of the colon. The anus is the passage between the rectum and the outside of the body.  Causes  The veins can become swollen due to increased pressure in them. This is most often caused by:  · Chronic constipation or diarrhea  · Straining when having a bowel movement  · Sitting too long on the toilet  · A low-fiber diet  · Pregnancy  Symptoms  · Bleeding from the rectum (this may be noticeable after bowel movements)  · Lump near the anus  · Itching around the anus  · Pain around the anus  There are different types of hemorrhoids. Depending on the type you have and the severity, you may be able to treat yourself at home. In some cases, a procedure may be the best treatment option. Your healthcare provider can tell you more about this, if needed.  Home care  General care  · To get relief from pain or itching, try:  ¨ Topical products. Your healthcare provider may prescribe or recommend creams, ointments, or pads that can be applied to the hemorrhoid. Use these exactly as directed.  ¨ Medicines. Your healthcare provider may recommend stool softeners, suppositories, or laxatives to help manage constipation. Use these exactly as directed.  ¨ Sitz baths. A sitz bath involves sitting in a few inches of warm bath water. Be careful not to make the water so hot that you burn yourself--test it before sitting in it. Soak for about 10 to 15 minutes a few times a day. This may help relieve pain.  Tips to help prevent hemorrhoids  · Eat more fiber. Fiber adds bulk to stool and absorbs water as it moves through your colon. This makes stool softer and easier to pass.  ¨ Increase the fiber in your diet with more fiber-rich foods. These include fresh fruit, vegetables, and whole  grains.  ¨ Take a fiber supplement or bulking agent, if advised to by your provider. These include products such as psyllium or methylcellulose.  · Drink plenty of water, if directed to by your provider. This can help keep stool soft.  · Be more active. Frequent exercise aids digestion and helps prevent constipation. It may also help make bowel movements more regular.  · Dont strain during bowel movements. This can make hemorrhoids more likely. Also, dont sit on the toilet for long periods of time.  Follow-up care  Follow up with your healthcare provider, or as advised. If a culture or imaging tests were done, you will be notified of the results when they are ready. This may take a few days or longer.  When to seek medical advice  Call your healthcare provider right away if any of these occur:  · Increased bleeding from the rectum  · Increased pain around the rectum or anus  · Weakness or dizziness  Call 911  Call 911 or return to the emergency department right away if any of these occur:  · Trouble breathing or swallowing  · Fainting or loss of consciousness  · Unusually fast heart rate  · Vomiting blood  · Large amounts of blood in stool  Date Last Reviewed: 6/22/2015  © 1543-2545 [employer]. [address] [place] [address], [place] [address]. All rights reserved. This information is not intended as a substitute for professional medical care. Always follow your healthcare professional's instructions.

## 2020-01-26 NOTE — PATIENT INSTRUCTIONS
Please follow up with your Primary care provider within 2-5 days if your signs and symptoms have not resolved or worsen.     If your condition worsens or fails to improve we recommend that you receive another evaluation at the emergency room immediately or contact your primary medical clinic to discuss your concerns.    You must understand that you have received an Urgent Care treatment only and that you may be released before all of your medical problems are known or treated.   You, the patient, will arrange for follow up care as instructed.         Hemorrhoids    Hemorrhoids are swollen and inflamed veins inside the rectum and near the anus. The rectum is the last several inches of the colon. The anus is the passage between the rectum and the outside of the body.  Causes  The veins can become swollen due to increased pressure in them. This is most often caused by:  · Chronic constipation or diarrhea  · Straining when having a bowel movement  · Sitting too long on the toilet  · A low-fiber diet  · Pregnancy  Symptoms  · Bleeding from the rectum (this may be noticeable after bowel movements)  · Lump near the anus  · Itching around the anus  · Pain around the anus  There are different types of hemorrhoids. Depending on the type you have and the severity, you may be able to treat yourself at home. In some cases, a procedure may be the best treatment option. Your healthcare provider can tell you more about this, if needed.  Home care  General care  · To get relief from pain or itching, try:  ¨ Topical products. Your healthcare provider may prescribe or recommend creams, ointments, or pads that can be applied to the hemorrhoid. Use these exactly as directed.  ¨ Medicines. Your healthcare provider may recommend stool softeners, suppositories, or laxatives to help manage constipation. Use these exactly as directed.  ¨ Sitz baths. A sitz bath involves sitting in a few inches of warm bath water. Be careful not to make the  water so hot that you burn yourself--test it before sitting in it. Soak for about 10 to 15 minutes a few times a day. This may help relieve pain.  Tips to help prevent hemorrhoids  · Eat more fiber. Fiber adds bulk to stool and absorbs water as it moves through your colon. This makes stool softer and easier to pass.  ¨ Increase the fiber in your diet with more fiber-rich foods. These include fresh fruit, vegetables, and whole grains.  ¨ Take a fiber supplement or bulking agent, if advised to by your provider. These include products such as psyllium or methylcellulose.  · Drink plenty of water, if directed to by your provider. This can help keep stool soft.  · Be more active. Frequent exercise aids digestion and helps prevent constipation. It may also help make bowel movements more regular.  · Dont strain during bowel movements. This can make hemorrhoids more likely. Also, dont sit on the toilet for long periods of time.  Follow-up care  Follow up with your healthcare provider, or as advised. If a culture or imaging tests were done, you will be notified of the results when they are ready. This may take a few days or longer.  When to seek medical advice  Call your healthcare provider right away if any of these occur:  · Increased bleeding from the rectum  · Increased pain around the rectum or anus  · Weakness or dizziness  Call 911  Call 911 or return to the emergency department right away if any of these occur:  · Trouble breathing or swallowing  · Fainting or loss of consciousness  · Unusually fast heart rate  · Vomiting blood  · Large amounts of blood in stool  Date Last Reviewed: 6/22/2015  © 6304-7223 [employer]. [address] [place] [address], [place] [address]. All rights reserved. This information is not intended as a substitute for professional medical care. Always follow your healthcare professional's instructions.

## 2020-01-27 ENCOUNTER — PATIENT MESSAGE (OUTPATIENT)
Dept: INTERNAL MEDICINE | Facility: CLINIC | Age: 60
End: 2020-01-27

## 2020-01-27 DIAGNOSIS — J44.9 CHRONIC OBSTRUCTIVE PULMONARY DISEASE, UNSPECIFIED COPD TYPE: Primary | ICD-10-CM

## 2020-01-28 ENCOUNTER — TELEPHONE (OUTPATIENT)
Dept: INTERNAL MEDICINE | Facility: CLINIC | Age: 60
End: 2020-01-28

## 2020-01-28 NOTE — TELEPHONE ENCOUNTER
----- Message from Jerry [place] sent at 1/28/2020  4:23 PM CST -----  Contact: Ileana [facility]   Ileana state the CT orders need to be fax to . Please advise.

## 2020-01-28 NOTE — TELEPHONE ENCOUNTER
Called pt regarding CT being sent to [facility]. I repeatedly informed her that Dr. Hargrove is concerned with how to get the images thereafter as well as the report as we had trouble getting CT images from a disc previously provided by this pt.     She expressed her frustration with waiting and saying that Dr. Hargrove is not telling her anything regarding what he thinks the issue is and that she's been waiting 3 months for this test which has her worried, having nightmares, and bringing up concerns about her daughter and who will care for her if she dies as the pt believes this may be cancer.    I reassured her that she can get the test done wherever she wants. I told her I was going to send the order over now, and when she gets the test done, please get a copy of the images as well as the report to bring here to the office.     She calmed down and expressed understanding and thanks.    Warren Feliciano

## 2020-01-28 NOTE — TELEPHONE ENCOUNTER
----- Message from Paris [place] sent at 1/28/2020 12:22 PM CST -----  Contact: David [phone]  Requesting orders for a CAT scan of the chest to be faxed to [phone].    Please call and advise.    Thank You

## 2020-01-28 NOTE — TELEPHONE ENCOUNTER
Spoke with pt on the phone; please see last msg.    P.S. to last msg: She also expressed how much cheaper the CT would be for her at [facility] versus here at [facility]. I told her I understood her decision to get the test done elsewhere as the price difference was significant.    Warren Feliciano

## 2020-01-29 ENCOUNTER — TELEPHONE (OUTPATIENT)
Dept: URGENT CARE | Facility: CLINIC | Age: 60
End: 2020-01-29

## 2020-02-03 ENCOUNTER — PATIENT MESSAGE (OUTPATIENT)
Dept: INTERNAL MEDICINE | Facility: CLINIC | Age: 60
End: 2020-02-03

## 2020-02-10 ENCOUNTER — PATIENT MESSAGE (OUTPATIENT)
Dept: INTERNAL MEDICINE | Facility: CLINIC | Age: 60
End: 2020-02-10

## 2020-02-10 NOTE — TELEPHONE ENCOUNTER
Dr. Hargrove saw the results I left on his desk this morning when he came back into office.    Warren Feliciano

## 2020-02-26 ENCOUNTER — PATIENT MESSAGE (OUTPATIENT)
Dept: INTERNAL MEDICINE | Facility: CLINIC | Age: 60
End: 2020-02-26

## 2020-03-05 ENCOUNTER — PATIENT MESSAGE (OUTPATIENT)
Dept: INTERNAL MEDICINE | Facility: CLINIC | Age: 60
End: 2020-03-05

## 2020-03-05 DIAGNOSIS — J44.9 CHRONIC OBSTRUCTIVE PULMONARY DISEASE, UNSPECIFIED COPD TYPE: Primary | ICD-10-CM

## 2020-03-06 RX ORDER — ALBUTEROL SULFATE 1.25 MG/3ML
1.25 SOLUTION RESPIRATORY (INHALATION) EVERY 6 HOURS PRN
Qty: 3 BOX | Refills: 3 | Status: SHIPPED | OUTPATIENT
Start: 2020-03-06 | End: 2020-03-11 | Stop reason: SDUPTHER

## 2020-03-10 ENCOUNTER — PATIENT MESSAGE (OUTPATIENT)
Dept: INTERNAL MEDICINE | Facility: CLINIC | Age: 60
End: 2020-03-10

## 2020-03-11 ENCOUNTER — PATIENT MESSAGE (OUTPATIENT)
Dept: INTERNAL MEDICINE | Facility: CLINIC | Age: 60
End: 2020-03-11

## 2020-03-11 DIAGNOSIS — J44.9 CHRONIC OBSTRUCTIVE PULMONARY DISEASE, UNSPECIFIED COPD TYPE: ICD-10-CM

## 2020-03-11 RX ORDER — ALBUTEROL SULFATE 1.25 MG/3ML
1.25 SOLUTION RESPIRATORY (INHALATION) EVERY 6 HOURS PRN
Qty: 3 BOX | Refills: 3 | Status: SHIPPED | OUTPATIENT
Start: 2020-03-11 | End: 2021-03-11

## 2020-03-31 ENCOUNTER — PATIENT MESSAGE (OUTPATIENT)
Dept: INTERNAL MEDICINE | Facility: CLINIC | Age: 60
End: 2020-03-31

## 2020-04-01 NOTE — TELEPHONE ENCOUNTER
Wife works for [employer] so is still going to work  She has worries about her wife being an asx carrier  Discussed this with her today  Discussed all precautions  She was grateful for the call

## 2020-04-06 ENCOUNTER — PATIENT MESSAGE (OUTPATIENT)
Dept: INTERNAL MEDICINE | Facility: CLINIC | Age: 60
End: 2020-04-06

## 2020-04-08 ENCOUNTER — PATIENT MESSAGE (OUTPATIENT)
Dept: INTERNAL MEDICINE | Facility: CLINIC | Age: 60
End: 2020-04-08

## 2020-04-08 DIAGNOSIS — G43.109 MIGRAINE WITH AURA AND WITHOUT STATUS MIGRAINOSUS, NOT INTRACTABLE: ICD-10-CM

## 2020-04-14 ENCOUNTER — PATIENT MESSAGE (OUTPATIENT)
Dept: INTERNAL MEDICINE | Facility: CLINIC | Age: 60
End: 2020-04-14

## 2020-04-14 NOTE — TELEPHONE ENCOUNTER
We can get a higher dose of fluticasone (200mcg bs 100mcg) on breo and incruse separately. The other components should be the same. Does she want to switch back to breo and incruse?

## 2020-04-14 NOTE — TELEPHONE ENCOUNTER
Called pt as she had a Trelogy dosage concern. She said that it was not as strong of a dosage of medication as she is used to getting. She was on 3 separate inhalers and now is using this one, but the Trelogy only comes in one dosage and is not helping her as she feels it should. She would like to know what to do for next steps. Please advise.    Wraren Feliciano

## 2020-04-15 ENCOUNTER — PATIENT MESSAGE (OUTPATIENT)
Dept: INTERNAL MEDICINE | Facility: CLINIC | Age: 60
End: 2020-04-15

## 2020-04-15 DIAGNOSIS — J44.9 CHRONIC OBSTRUCTIVE PULMONARY DISEASE, UNSPECIFIED COPD TYPE: Primary | ICD-10-CM

## 2020-04-15 RX ORDER — FLUTICASONE FUROATE AND VILANTEROL 200; 25 UG/1; UG/1
1 POWDER RESPIRATORY (INHALATION) DAILY
Qty: 60 EACH | Refills: 2 | Status: SHIPPED | OUTPATIENT
Start: 2020-04-15 | End: 2020-07-13

## 2020-04-25 ENCOUNTER — PATIENT MESSAGE (OUTPATIENT)
Dept: INTERNAL MEDICINE | Facility: CLINIC | Age: 60
End: 2020-04-25

## 2020-07-02 ENCOUNTER — OFFICE VISIT (OUTPATIENT)
Dept: INTERNAL MEDICINE | Facility: CLINIC | Age: 60
End: 2020-07-02
Payer: COMMERCIAL

## 2020-07-02 DIAGNOSIS — G43.109 MIGRAINE WITH AURA AND WITHOUT STATUS MIGRAINOSUS, NOT INTRACTABLE: ICD-10-CM

## 2020-07-02 DIAGNOSIS — G43.909 MIGRAINE WITHOUT STATUS MIGRAINOSUS, NOT INTRACTABLE, UNSPECIFIED MIGRAINE TYPE: Primary | ICD-10-CM

## 2020-07-02 PROCEDURE — 99441 PR PHYSICIAN TELEPHONE EVALUATION 5-10 MIN: CPT | Mod: 95,,, | Performed by: INTERNAL MEDICINE

## 2020-07-02 PROCEDURE — 99441 PR PHYSICIAN TELEPHONE EVALUATION 5-10 MIN: ICD-10-PCS | Mod: 95,,, | Performed by: INTERNAL MEDICINE

## 2020-07-02 RX ORDER — OXYCODONE AND ACETAMINOPHEN 10; 325 MG/1; MG/1
1 TABLET ORAL 3 TIMES DAILY PRN
Qty: 21 TABLET | Refills: 0 | Status: SHIPPED | OUTPATIENT
Start: 2020-07-02 | End: 2021-02-05 | Stop reason: SDUPTHER

## 2020-07-02 NOTE — PROGRESS NOTES
Established Patient - Audio Only Telehealth Visit     The patient location is: home, [facility]  The chief complaint leading to consultation is: advice  Visit type: Virtual visit with audio only (telephone)  Total time spent with patient: 5       The reason for the audio only service rather than synchronous audio and video virtual visit was related to technical difficulties or patient preference/necessity.     Each patient to whom I provide medical services by telemedicine is:  (1) informed of the relationship between the physician and patient and the respective role of any other health care provider with respect to management of the patient; and (2) notified that they may decline to receive medical services by telemedicine and may withdraw from such care at any time. Patient verbally consented to receive this service via voice-only telephone call.       HPI:   Patient is scheduled to have her disability exam done and is concerned about risk of going into office during COVID.  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    Patient also asking for medication refills.     Assessment and plan:      1. Covid concern  Recommend patient contact doctor's office and see what precautions they are taking.  Told patient if they are not screening for symptoms on arrival, do not have strategies to help patient's social distance at the office and not requiring universal mask use, I would recommend that she reschedule or find another provider due to her risk of complications if she were developed COVID.  Explained even with all precaution it is still not a 0 risk event.    Migraine  Patient occasionally has to take single Percocet S salvage therapy for migraines not relieved by her other strategies.  Reports this happens 1 or 2 times a year.  She has not had her prescription refilled since she relocated from [place]  last August.  Discussed with patient I do not like co-prescribing opioids and benzodiazepines due to risk of increase sedation.  However patient takes approximately 1 opioid every 2-3 months and uses her p.r.n. temazepam sleep medication approximately once or twice a month.  Counseled extensively on risk of taking both at same time and to avoid under all circumstances.  Patient voices understanding.

## 2020-07-12 DIAGNOSIS — J44.9 CHRONIC OBSTRUCTIVE PULMONARY DISEASE, UNSPECIFIED COPD TYPE: ICD-10-CM

## 2020-07-13 DIAGNOSIS — J44.9 CHRONIC OBSTRUCTIVE PULMONARY DISEASE, UNSPECIFIED COPD TYPE: ICD-10-CM

## 2020-07-13 RX ORDER — FLUTICASONE FUROATE AND VILANTEROL TRIFENATATE 200; 25 UG/1; UG/1
POWDER RESPIRATORY (INHALATION)
Qty: 180 EACH | Refills: 3 | Status: SHIPPED | OUTPATIENT
Start: 2020-07-13

## 2020-07-13 RX ORDER — UMECLIDINIUM 62.5 UG/1
AEROSOL, POWDER ORAL
Qty: 90 EACH | Refills: 3 | Status: SHIPPED | OUTPATIENT
Start: 2020-07-13

## 2020-07-13 NOTE — PROGRESS NOTES
Refill Authorization Note    is requesting a refill authorization.    Brief assessment and rationale for refill: APPROVE: prr          Medication Therapy Plan: approve 12 more    Medication reconciliation completed: No                         Comments:      Requested Prescriptions   Signed Prescriptions Disp Refills    INCRUSE ELLIPTA 62.5 mcg/actuation inhalation capsule 90 each 3     Sig: INHALE 1 CAPSULE (63 MCG TOTAL) INTO THE LUNGS ONCE DAILY.       Pulmonology:  Anticholinergic Agents Passed - 7/13/2020 12:17 AM        Passed - Patient is at least 18 years old        Passed - Patient has asthma or COPD and there is a controller medication on the active medication list        Passed - Office visit in past 12 months or future 90 days.     Recent Outpatient Visits            1 week ago Migraine without status migrainosus, not intractable, unspecified migraine type    [facility] Agus Hargrove MD    5 months ago Hemorrhoids, unspecified hemorrhoid type    [facility] [place] NP    8 months ago Migraine with aura and without status migrainosus, not intractable    [facility] Agus Hargrove MD    9 months ago Bilateral otitis media, unspecified otitis media type    [facility] [place] Elias NP          Future Appointments              Tomorrow MD [facility] - [facility] Medicine, [facility]                    Appointments  past 12m or future 3m with PCP    Date Provider   Last Visit   7/2/2020 Agus Hargrove MD   Next Visit   7/14/2020 Agus Hargrove MD   ED visits in past 90 days: 1     Note composed:3:16 PM 07/13/2020

## 2020-07-13 NOTE — PROGRESS NOTES
Refill Authorization Note    is requesting a refill authorization.    Brief assessment and rationale for refill: APPROVE: prr          Medication Therapy Plan: approve 12 more    Medication reconciliation completed: No                         Comments:      Requested Prescriptions   Signed Prescriptions Disp Refills    BREO ELLIPTA 200-25 mcg/dose DsDv diskus inhaler 180 each 3     Sig: INHALE 1 PUFF INTO THE LUNGS ONCE DAILY       There is no refill protocol information for this order         Appointments  past 12m or future 3m with PCP    Date Provider   Last Visit   Visit date not found Jazzmine Lo MD   Next Visit   7/13/2020 Jazzmine Lo MD   ED visits in past 90 days: 1     Note composed:1:48 PM 07/13/2020

## 2020-08-17 ENCOUNTER — PATIENT MESSAGE (OUTPATIENT)
Dept: INTERNAL MEDICINE | Facility: CLINIC | Age: 60
End: 2020-08-17

## 2020-08-17 DIAGNOSIS — K59.04 CHRONIC IDIOPATHIC CONSTIPATION: ICD-10-CM

## 2020-08-17 RX ORDER — PRUCALOPRIDE 2 MG/1
2 TABLET, FILM COATED ORAL DAILY
Qty: 90 TABLET | Refills: 3 | Status: SHIPPED | OUTPATIENT
Start: 2020-08-17 | End: 2020-11-15

## 2020-09-02 ENCOUNTER — PATIENT MESSAGE (OUTPATIENT)
Dept: INTERNAL MEDICINE | Facility: CLINIC | Age: 60
End: 2020-09-02

## 2020-09-11 NOTE — TELEPHONE ENCOUNTER
Prior authorization done via [email or web address] please allow up to 72 hours for response. Thanks

## 2020-09-14 ENCOUNTER — PATIENT MESSAGE (OUTPATIENT)
Dept: INTERNAL MEDICINE | Facility: CLINIC | Age: 60
End: 2020-09-14

## 2020-09-23 ENCOUNTER — PATIENT MESSAGE (OUTPATIENT)
Dept: INTERNAL MEDICINE | Facility: CLINIC | Age: 60
End: 2020-09-23

## 2020-09-23 DIAGNOSIS — G43.109 MIGRAINE WITH AURA AND WITHOUT STATUS MIGRAINOSUS, NOT INTRACTABLE: ICD-10-CM

## 2020-09-23 RX ORDER — ELETRIPTAN HYDROBROMIDE 40 MG/1
40 TABLET, FILM COATED ORAL
Qty: 30 TABLET | Refills: 1 | Status: SHIPPED | OUTPATIENT
Start: 2020-09-23

## 2020-09-23 NOTE — TELEPHONE ENCOUNTER
No new care gaps identified.  Powered by [employer]. Reference number: [id number]. 9/23/2020 4:03:59 PM CDT

## 2020-09-30 ENCOUNTER — NURSE TRIAGE (OUTPATIENT)
Dept: ADMINISTRATIVE | Facility: CLINIC | Age: 60
End: 2020-09-30

## 2020-09-30 NOTE — TELEPHONE ENCOUNTER
Spoke with patient she stated that she received a pneumonia and shingles vaccine yesterday.  States her current symptoms are fever, feeling fatigued, and cough.  Current temp 100.8.  Denies having any difficulty breathing.  Advised patient to be seen within the next 3-4 hours.  Patient verbalized understanding.     Reason for Disposition   [1] Fever > 100.0 F (37.8 C) AND [2] diabetes mellitus or weak immune system (e.g., HIV positive, cancer chemo, splenectomy, organ transplant, chronic steroids)    Additional Information   Negative: Shock suspected (e.g., cold/pale/clammy skin, too weak to stand, low BP, rapid pulse)   Negative: Difficult to awaken or acting confused (e.g., disoriented, slurred speech)   Negative: [1] Difficulty breathing AND [2] bluish lips, tongue or face   Negative: New onset rash with multiple purple (or blood-colored) spots or dots   Negative: Sounds like a life-threatening emergency to the triager   Negative: [1] Headache AND [2] stiff neck (can't touch chin to chest)   Negative: Difficulty breathing   Negative: IV drug abuse   Negative: [1] Drinking very little AND [2] dehydration suspected (e.g., no urine > 12 hours, very dry mouth, very lightheaded)   Negative: Patient sounds very sick or weak to the triager  (Exception: mild weakness and hasn't taken fever medicine)   Negative: Fever > 104 F (40 C)   Negative: [1] Fever > 101 F (38.3 C) AND [2] age > 60   Negative: [1] Fever > 100.0 F (37.8 C) AND [2] bedridden (e.g., nursing home patient, CVA, chronic illness, recovering from surgery)   Negative: [1] Fever > 100.0 F (37.8 C) AND [2] indwelling urinary catheter (e.g., Boateng, Coude)   Negative: [1] Fever > 100.0 F (37.8 C) AND [2] has port (portacath), central line, or PICC line    Protocols used: FEVER-A-AH

## 2020-10-01 ENCOUNTER — OFFICE VISIT (OUTPATIENT)
Dept: URGENT CARE | Facility: CLINIC | Age: 60
End: 2020-10-01
Payer: COMMERCIAL

## 2020-10-01 VITALS
SYSTOLIC BLOOD PRESSURE: 98 MMHG | RESPIRATION RATE: 16 BRPM | TEMPERATURE: 98 F | HEART RATE: 100 BPM | BODY MASS INDEX: 20.8 KG/M2 | OXYGEN SATURATION: 98 % | DIASTOLIC BLOOD PRESSURE: 58 MMHG | WEIGHT: 113 LBS | HEIGHT: 62 IN

## 2020-10-01 DIAGNOSIS — R43.0 LOSS OF SMELL: ICD-10-CM

## 2020-10-01 DIAGNOSIS — R43.2 LOSS OF TASTE: ICD-10-CM

## 2020-10-01 DIAGNOSIS — R05.9 COUGH: Primary | ICD-10-CM

## 2020-10-01 DIAGNOSIS — J06.9 UPPER RESPIRATORY TRACT INFECTION, UNSPECIFIED TYPE: ICD-10-CM

## 2020-10-01 LAB
CTP QC/QA: YES
SARS-COV-2 RDRP RESP QL NAA+PROBE: NEGATIVE

## 2020-10-01 PROCEDURE — 99214 OFFICE O/P EST MOD 30 MIN: CPT | Mod: S$GLB,,, | Performed by: PHYSICIAN ASSISTANT

## 2020-10-01 PROCEDURE — U0002: ICD-10-PCS | Mod: QW,S$GLB,, | Performed by: PHYSICIAN ASSISTANT

## 2020-10-01 PROCEDURE — U0002 COVID-19 LAB TEST NON-CDC: HCPCS | Mod: QW,S$GLB,, | Performed by: PHYSICIAN ASSISTANT

## 2020-10-01 PROCEDURE — 99214 PR OFFICE/OUTPT VISIT, EST, LEVL IV, 30-39 MIN: ICD-10-PCS | Mod: S$GLB,,, | Performed by: PHYSICIAN ASSISTANT

## 2020-10-01 NOTE — PROGRESS NOTES
"Subjective:       Patient ID: Shavon Marrero is a 60 y.o. female.    Vitals:  height is 5' 2" (1.575 m) and weight is 51.3 kg (113 lb). Her temporal temperature is 98.1 °F (36.7 °C). Her blood pressure is 98/58 (abnormal) and her pulse is 100. Her respiration is 16 and oxygen saturation is 98%.     Chief Complaint: COVID-19 Concerns and Cough (fever 101.0)    Pt c/o loss of taste and smell, fever, and cough for 2 days.  She states she had flu, shingles, and pneumonia shot on 09/29. The next day she woke up with fever and loss of taste of smell.  Denies shortness of breath or difficulty breathing.    Cough  This is a new problem. Episode onset: 2 days. The problem has been gradually worsening. The problem occurs every few minutes. The cough is productive of sputum. Associated symptoms include a fever, headaches and myalgias. Pertinent negatives include no chills, ear pain, eye redness, hemoptysis, rash, sore throat, shortness of breath or wheezing. Nothing aggravates the symptoms. Treatments tried: tylenol. Her past medical history is significant for COPD.       Constitution: Positive for fever. Negative for chills, sweating and fatigue.   HENT: Negative for ear pain, congestion, sinus pain, sinus pressure, sore throat and voice change.         Loss of taste and smell   Neck: Negative for painful lymph nodes.   Eyes: Negative for eye redness.   Respiratory: Positive for cough. Negative for chest tightness, sputum production, bloody sputum, COPD, shortness of breath, stridor, wheezing and asthma.    Gastrointestinal: Negative for nausea and vomiting.   Musculoskeletal: Positive for muscle ache.   Skin: Negative for rash.   Allergic/Immunologic: Negative for seasonal allergies and asthma.   Neurological: Positive for headaches.   Hematologic/Lymphatic: Negative for swollen lymph nodes.       Objective:      Physical Exam   Constitutional: She is oriented to person, place, and time. She appears well-developed. She is " cooperative.  Non-toxic appearance. She does not appear ill. No distress.   HENT:   Head: Normocephalic and atraumatic.   Ears:   Right Ear: Hearing, tympanic membrane, external ear and ear canal normal.   Left Ear: Hearing, tympanic membrane, external ear and ear canal normal.   Nose: Nose normal. No mucosal edema, rhinorrhea or nasal deformity. No epistaxis. Right sinus exhibits no maxillary sinus tenderness and no frontal sinus tenderness. Left sinus exhibits no maxillary sinus tenderness and no frontal sinus tenderness.   Mouth/Throat: Uvula is midline, oropharynx is clear and moist and mucous membranes are normal. No trismus in the jaw. Normal dentition. No uvula swelling. No oropharyngeal exudate, posterior oropharyngeal edema or posterior oropharyngeal erythema.   Eyes: Conjunctivae and lids are normal. No scleral icterus.   Neck: Trachea normal, full passive range of motion without pain and phonation normal. Neck supple. No neck rigidity. No edema and no erythema present.   Cardiovascular: Normal rate, regular rhythm, normal heart sounds and normal pulses.   Pulmonary/Chest: Effort normal and breath sounds normal. No stridor. No respiratory distress. She has no decreased breath sounds. She has no wheezes. She has no rhonchi. She has no rales.   Abdominal: Normal appearance.   Musculoskeletal: Normal range of motion.         General: No deformity.   Neurological: She is alert and oriented to person, place, and time. She exhibits normal muscle tone. Coordination normal.      Comments: CN's grossly intact   Skin: Skin is warm, dry, intact, not diaphoretic and not pale. Psychiatric: Her speech is normal and behavior is normal. Judgment and thought content normal.   Nursing note and vitals reviewed.          Results for orders placed or performed in visit on 10/01/20   POCT COVID-19 Rapid Screening   Result Value Ref Range    POC Rapid COVID Negative Negative     Acceptable Yes      Results  reviewed with pt.  Assessment:       1. Cough    2. Loss of taste    3. Loss of smell    4. Upper respiratory tract infection, unspecified type        Plan:         Cough  -     POCT COVID-19 Rapid Screening    Loss of taste    Loss of smell    Upper respiratory tract infection, unspecified type           Patient Instructions     Viral Upper Respiratory Illness (Adult)  You have a viral upper respiratory illness (URI), which is another term for the common cold. This illness is contagious during the first few days. It is spread through the air by coughing and sneezing. It may also be spread by direct contact (touching the sick person and then touching your own eyes, nose, or mouth). Frequent handwashing will decrease risk of spread. Most viral illnesses go away within 7 to 10 days with rest and simple home remedies. Sometimes the illness may last for several weeks. Antibiotics will not kill a virus, and they are generally not prescribed for this condition.    Home care  · If symptoms are severe, rest at home for the first 2 to 3 days. When you resume activity, don't let yourself get too tired.  · Avoid being exposed to cigarette smoke (yours or others).  · You may use acetaminophen or ibuprofen to control pain and fever, unless another medicine was prescribed. (Note: If you have chronic liver or kidney disease, have ever had a stomach ulcer or gastrointestinal bleeding, or are taking blood-thinning medicines, talk with your healthcare provider before using these medicines.) Aspirin should never be given to anyone under 18 years of age who is ill with a viral infection or fever. It may cause severe liver or brain damage.  · Your appetite may be poor, so a light diet is fine. Avoid dehydration by drinking 6 to 8 glasses of fluids per day (water, soft drinks, juices, tea, or soup). Extra fluids will help loosen secretions in the nose and lungs.  · Over-the-counter cold medicines will not shorten the length of time  youre sick, but they may be helpful for the following symptoms: cough, sore throat, and nasal and sinus congestion. (Note: Do not use decongestants if you have high blood pressure.)  Follow-up care  Follow up with your healthcare provider, or as advised.  When to seek medical advice  Call your healthcare provider right away if any of these occur:  · Cough with lots of colored sputum (mucus)  · Severe headache; face, neck, or ear pain  · Difficulty swallowing due to throat pain  · Fever of 100.4°F (38°C)  Call 911, or get immediate medical care  Call emergency services right away if any of these occur:  · Chest pain, shortness of breath, wheezing, or difficulty breathing  · Coughing up blood  · Inability to swallow due to throat pain  Date Last Reviewed: 9/13/2015  © 1202-2592 [employer]. [address] [place] [address]. All rights reserved. This information is not intended as a substitute for professional medical care. Always follow your healthcare professional's instructions.      You must understand that you've received an Urgent Care treatment only and that you may be released before all your medical problems are known or treated. You, the patient, will arrange for follow up care as instructed.    Follow up with your PCP or specialty clinic as directed in the next 1-2 weeks if not improved or as needed. You can call [phone] to schedule an appointment with the appropriate provider.    If your condition worsens we recommend that you receive another evaluation at the emergency room immediately or contact your primary medical clinic's after hours call service to discuss your concerns.    Please go to the Emergency Department for any concerns or worsening of condition.

## 2020-10-01 NOTE — PATIENT INSTRUCTIONS
Viral Upper Respiratory Illness (Adult)  You have a viral upper respiratory illness (URI), which is another term for the common cold. This illness is contagious during the first few days. It is spread through the air by coughing and sneezing. It may also be spread by direct contact (touching the sick person and then touching your own eyes, nose, or mouth). Frequent handwashing will decrease risk of spread. Most viral illnesses go away within 7 to 10 days with rest and simple home remedies. Sometimes the illness may last for several weeks. Antibiotics will not kill a virus, and they are generally not prescribed for this condition.    Home care  · If symptoms are severe, rest at home for the first 2 to 3 days. When you resume activity, don't let yourself get too tired.  · Avoid being exposed to cigarette smoke (yours or others).  · You may use acetaminophen or ibuprofen to control pain and fever, unless another medicine was prescribed. (Note: If you have chronic liver or kidney disease, have ever had a stomach ulcer or gastrointestinal bleeding, or are taking blood-thinning medicines, talk with your healthcare provider before using these medicines.) Aspirin should never be given to anyone under 18 years of age who is ill with a viral infection or fever. It may cause severe liver or brain damage.  · Your appetite may be poor, so a light diet is fine. Avoid dehydration by drinking 6 to 8 glasses of fluids per day (water, soft drinks, juices, tea, or soup). Extra fluids will help loosen secretions in the nose and lungs.  · Over-the-counter cold medicines will not shorten the length of time youre sick, but they may be helpful for the following symptoms: cough, sore throat, and nasal and sinus congestion. (Note: Do not use decongestants if you have high blood pressure.)  Follow-up care  Follow up with your healthcare provider, or as advised.  When to seek medical advice  Call your healthcare provider right away if any  of these occur:  · Cough with lots of colored sputum (mucus)  · Severe headache; face, neck, or ear pain  · Difficulty swallowing due to throat pain  · Fever of 100.4°F (38°C)  Call 911, or get immediate medical care  Call emergency services right away if any of these occur:  · Chest pain, shortness of breath, wheezing, or difficulty breathing  · Coughing up blood  · Inability to swallow due to throat pain  Date Last Reviewed: 9/13/2015  © 7557-5833 [employer]. [address] [place] [address]. All rights reserved. This information is not intended as a substitute for professional medical care. Always follow your healthcare professional's instructions.      You must understand that you've received an Urgent Care treatment only and that you may be released before all your medical problems are known or treated. You, the patient, will arrange for follow up care as instructed.    Follow up with your PCP or specialty clinic as directed in the next 1-2 weeks if not improved or as needed. You can call [phone] to schedule an appointment with the appropriate provider.    If your condition worsens we recommend that you receive another evaluation at the emergency room immediately or contact your primary medical clinic's after hours call service to discuss your concerns.    Please go to the Emergency Department for any concerns or worsening of condition.

## 2020-10-01 NOTE — TELEPHONE ENCOUNTER
Duplicate contact.     Patient having difficulty with [facility]. Patient refuses [facility] customer service number. Patient is interested in UC. Triager verified closest UC location and hours. Patient agrees to go to [facility].      Reason for Disposition   Caller has already spoken with another triager or PCP AND has further questions AND triager able to answer questions.    Protocols used: NO CONTACT OR DUPLICATE CONTACT CALL-A-AH

## 2020-10-04 ENCOUNTER — TELEPHONE (OUTPATIENT)
Dept: URGENT CARE | Facility: CLINIC | Age: 60
End: 2020-10-04

## 2020-10-05 ENCOUNTER — PATIENT MESSAGE (OUTPATIENT)
Dept: ADMINISTRATIVE | Facility: HOSPITAL | Age: 60
End: 2020-10-05

## 2020-10-06 ENCOUNTER — OFFICE VISIT (OUTPATIENT)
Dept: INTERNAL MEDICINE | Facility: CLINIC | Age: 60
End: 2020-10-06
Payer: COMMERCIAL

## 2020-10-06 DIAGNOSIS — Z20.822 SUSPECTED COVID-19 VIRUS INFECTION: Primary | ICD-10-CM

## 2020-10-06 DIAGNOSIS — R50.9 FEVER, UNSPECIFIED FEVER CAUSE: ICD-10-CM

## 2020-10-06 PROCEDURE — 99214 PR OFFICE/OUTPT VISIT, EST, LEVL IV, 30-39 MIN: ICD-10-PCS | Mod: 95,CS,, | Performed by: INTERNAL MEDICINE

## 2020-10-06 PROCEDURE — 99214 OFFICE O/P EST MOD 30 MIN: CPT | Mod: 95,CS,, | Performed by: INTERNAL MEDICINE

## 2020-10-06 NOTE — PROGRESS NOTES
The patient location is:  [facility]  The chief complaint leading to consultation is:  COVID concern  Visit type: Virtual visit with synchronous audio and video  Total time spent with patient:  15 min  Each patient to whom he or she provides medical services by telemedicine is:  (1) informed of the relationship between the physician and patient and the respective role of any other health care provider with respect to management of the patient; and (2) notified that he or she may decline to receive medical services by telemedicine and may withdraw from such care at any time.      CHIEF COMPLAINT     No chief complaint on file.  TELEMEDICINE VISIT  CC:  COVID concern    HPI     Shavon Marrero is 60 y.o. female with COPD migraine history tobacco disorder here today for concern for COVID.  Patient reports 6 days of fever, fatigue anosmia loss of taste and decreased appetite.  Reports she was seen in urgent care on October 1st for she had a rapid COVID test that was negative.  However symptoms persist.  She has been taking Tylenol using her inhalers around the clock.  Reports he gets some relief.  Reports that when she lays down and sleeps she can sleep for much longer than normal for her.  She has been checking her pulse ox at home and O2 sats have remained above 96.    She is concerned she may have a false negative COVID test.  Reports that she got her Shingrix shot on the 29th a day before symptoms began, however, symptoms have persisted for greater than a week.  Patient has not been in contact with anybody that is known to have COVID infection.    Personally Reviewed Patient's Medical, surgical, family and social hx. Changes updated in [facility].  Care Team updated in Epic    Review of Systems:  Review of Systems   Constitutional: Positive for fever and malaise/fatigue.   HENT:        Loss of taste loss of smell   Respiratory: Positive for shortness of breath.    Gastrointestinal: Negative for nausea and vomiting.         Decreased appetite   Neurological: Positive for weakness.           PHYSICAL EXAM       Gen: Well Appearing, NAD  HEENT: PERR, EOMI  Chest: Normal work of breathing,   Neuro: A&Ox3,  speech normal  Mood; Mood normal, behavior normal, thought process linear       LABS     Labs reviewed; Notable for  10/1 COVID test negative    ASSESSMENT     1. Suspected COVID-19 virus infection  COVID-19 Home Symptom Monitoring  - Duration (days): 14   2. Fever, unspecified fever cause               Plan     Shavon Marrero is a 60 y.o. female history of COPD, migraines and tobacco use disorder.  Here today with 6 days of viral symptoms concerning for COVID.  Unclear if patient had 2-tested false negative test.  Since patient is not requiring oxygen treatment course is really the same at this point.  Recommend continued symptomatic treatment at home.  Will set patient up for home monitoring.  Told patient to continue isolate for total of 10 days assuming that she remains afebrile for 24 hr and other symptoms are improving.  Patient agreeable.  New problem1. Suspected COVID-19 virus infection  - COVID-19 Home Symptom Monitoring  - Duration (days): 14    2. Fever, unspecified fever cause        Agus Hargrove MD

## 2020-10-22 ENCOUNTER — PATIENT MESSAGE (OUTPATIENT)
Dept: INTERNAL MEDICINE | Facility: CLINIC | Age: 60
End: 2020-10-22

## 2020-10-26 ENCOUNTER — TELEPHONE (OUTPATIENT)
Dept: INTERNAL MEDICINE | Facility: CLINIC | Age: 60
End: 2020-10-26

## 2020-10-27 ENCOUNTER — OFFICE VISIT (OUTPATIENT)
Dept: INTERNAL MEDICINE | Facility: CLINIC | Age: 60
End: 2020-10-27
Payer: COMMERCIAL

## 2020-10-27 ENCOUNTER — PATIENT MESSAGE (OUTPATIENT)
Dept: INTERNAL MEDICINE | Facility: CLINIC | Age: 60
End: 2020-10-27

## 2020-10-27 DIAGNOSIS — Z86.16 HISTORY OF 2019 NOVEL CORONAVIRUS DISEASE (COVID-19): ICD-10-CM

## 2020-10-27 DIAGNOSIS — R93.89 ABNORMAL CT OF THE CHEST: Primary | ICD-10-CM

## 2020-10-27 DIAGNOSIS — J44.9 CHRONIC OBSTRUCTIVE PULMONARY DISEASE, UNSPECIFIED COPD TYPE: ICD-10-CM

## 2020-10-27 PROCEDURE — 99443 PR PHYSICIAN TELEPHONE EVALUATION 21-30 MIN: ICD-10-PCS | Mod: 95,,, | Performed by: INTERNAL MEDICINE

## 2020-10-27 PROCEDURE — 99443 PR PHYSICIAN TELEPHONE EVALUATION 21-30 MIN: CPT | Mod: 95,,, | Performed by: INTERNAL MEDICINE

## 2020-10-27 NOTE — PROGRESS NOTES
The patient location is:  [place]  The chief complaint leading to consultation is:  Abnormal imaging  Visit type: Virtual visit with synchronous audio and video  Total time spent with patient:  25 min  Each patient to whom he or she provides medical services by telemedicine is:  (1) informed of the relationship between the physician and patient and the respective role of any other health care provider with respect to management of the patient; and (2) notified that he or she may decline to receive medical services by telemedicine and may withdraw from such care at any time.      CHIEF COMPLAINT     No chief complaint on file.  TELEMEDICINE VISIT  CC:  Abnormal imaging  HPI     Shavon Marrero is 60 y.o. female here today for   COPD    Patient with emphysema and recent COVID-19.  She had CT PE on 10/19 that did not show any significant pulmonary embolus but did show right upper consolidation.  She was subsequently seen in the ER 10/26 and had chest x-ray that showed consolidation. She was told to follow-up because it was hard to tell if maybe it was mask and underlying malignancy.    Patient does have fairly significant smoking history emphysematous changes on all imaging and has some atypical scarring changing that is being fall with radiological imaging.      Answers for HPI/ROS submitted by the patient on 10/27/2020   Back pain  Stiffness is present: all day    Personally Reviewed Patient's Medical, surgical, family and social hx. Changes updated in [employer].  Care Team updated in Epic    Review of Systems:  Review of Systems   Cardiovascular: Positive for chest pain.   Musculoskeletal: Positive for back pain.       Health Maintenance:   Reviewed with patient  Due for the following:      PHYSICAL EXAM       Audio due to technology failure      LABS     Labs reviewed; Notable for    ASSESSMENT     1. Abnormal CT of the chest  Ambulatory referral/consult to Pulmonology   2. Chronic obstructive pulmonary disease,  unspecified COPD type  Ambulatory referral/consult to Pulmonology   3. History of 2019 novel coronavirus disease (COVID-19)               Plan     Shavon Marrero is a 60 y.o. female  60 year-old female with smoking history and emphysema.  Patient currently has COVID-19 she status post treatment with antibiotic and steroid.  Chest x-ray on 10/26 showing new consolidation.  Spoke with reading radiologist who recommended 6 week x-ray to reassess.    1. Abnormal CT of the chest  - Ambulatory referral/consult to Pulmonology; Future    2. Chronic obstructive pulmonary disease, unspecified COPD type  Will reschedule PFTs once she recovers from current illness  - Ambulatory referral/consult to Pulmonology; Future    3. History of 2019 novel coronavirus disease (COVID-19)  Patient tested positive for COVID-19 on 10/19.  Her 10 day isolation ends 10/28/20.       Agus Hargrove MD

## 2020-11-10 ENCOUNTER — PATIENT MESSAGE (OUTPATIENT)
Dept: INTERNAL MEDICINE | Facility: CLINIC | Age: 60
End: 2020-11-10

## 2020-11-10 ENCOUNTER — PATIENT OUTREACH (OUTPATIENT)
Dept: ADMINISTRATIVE | Facility: OTHER | Age: 60
End: 2020-11-10

## 2020-11-10 DIAGNOSIS — G89.29 CHRONIC NECK PAIN: ICD-10-CM

## 2020-11-10 DIAGNOSIS — M54.2 CHRONIC NECK PAIN: ICD-10-CM

## 2020-11-10 RX ORDER — MELOXICAM 15 MG/1
15 TABLET ORAL DAILY
Qty: 30 TABLET | Refills: 0 | Status: SHIPPED | OUTPATIENT
Start: 2020-11-10

## 2020-11-10 NOTE — PROGRESS NOTES
Pulmonary & Critical Care Medicine   Clinic Note    Reason for Consultation: COPD, Abnormal CT chest     HPI: 60 y/ female with history of emphysema/COPD. Current everyday smoker. Originally from [place], but relocated to [facility] about one year prior 2/2 partners job. She has been maintained on prn albutero + breo + incruse for some time. In mid-October she developed increased cough, SOB, fatigue and right sided non-pleuritic thoracic pain. Seen in ED and + for COVID. CTA unrevealing for PE, but with new posterior RUL opacity. Never required supplemental O2 and treated with a combination of azithromycin + corticosteroids. Cough + fevers have resolved and not SOB. Still feels worn out/fatigued and intermittent pain in right chest, epigastric area. + reflux symptoms reported. Has seen her PCP, but due to concern she wanted pulmonary evaluation.      Additional Pulmonary History:   Occupational/Environmental Exposures:None. 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 Has a special needs child    Exposure to Animals/Pets: None   Travel History: None   History of exposures to TB: Denies    Family History of Lung Cancer: None   Childhood history of Lung Disease:None   Immunosuppression None   Chest surgery/trauma- None     Past Medical History:   Diagnosis Date    Arthritis     Cholecystitis     Chronic neck pain 2015    had radiculopathic symptoms- in BL hands s/p intervention    Emphysema lung 2013    most recent PFTS 2018s     Past Surgical History:   Procedure Laterality Date    ANKLE FRACTURE SURGERY      CHOLECYSTECTOMY      EYE SURGERY      NECK SURGERY       Family/Social History: Reviewed and updated.      Drug Allergies:   Review of patient's allergies indicates:  No Known Allergies    Review of Systems:   Constitutional: Negative for fever, chills, diaphoresis, activity change + fatigue   HENT: Negative for congestion, drooling, facial swelling, hearing loss, rhinorrhea, sinus pressure, tinnitus,  "trouble swallowing and voice change.    Eyes: Negative for photophobia, pain and visual disturbance.   Respiratory: Per HPi     Cardiovascular: Negative for palpitations and leg swelling. + chest pain per HPI   Gastrointestinal: Negative for nausea, vomiting, abdominal pain, diarrhea and abdominal distention. + reflux. No dysphagia.   Endocrine: Negative for cold intolerance, heat intolerance, polydipsia and polyuria.   Genitourinary: Negative for dysuria, frequency and hematuria.   Musculoskeletal: Negative for myalgias, back pain, arthralgias, neck pain and neck stiffness.   Skin: Negative for color change, pallor, rash and wound.   Allergic/Immunologic: Negative for immunocompromised state.   Neurological: Negative for dizziness, weakness and headaches.    A comprehensive 12-point review of systems was performed, and is negative except for those items mentioned above in the HPI section of this note.     Vital Signs:      /61 (BP Location: Right arm, Patient Position: Sitting)   Pulse 74   Ht 5' 2" (1.575 m)   Wt 49.9 kg (110 lb)   SpO2 98%   BMI 20.12 kg/m²      Physical Exam:   GEN- NAD AAOx3 Well Built, Well Appearing   HEENT- ATNC, PERRLA, EOMI, OP-Cl. No JVD, LAD or bruit noted. Trachea Midline.   CV- RRR No M/R/G  RESP- CTA-Bilateral   GI- S/NT/ND. Positive BS X 4. No HSM Noted  BACK- Spine midline. No step off, crepitus or deformity noted. No midline TTP.   Ext- MAEW, No deformity. No edema or rashes noted.   Neuro- Strength 5/5 symmetric. CN 2-12 intact. Normal gait. Normal sensation.      Personal Review and Summary of Prior Diagnostics    Laboratory Studies: Reviewed          SARS-CoV-2 RNA, Amplification, Qual Negative PositiveAbnormal            NT-proBNP 5 - 900 pg/mL 127      Sodium 136 - 145 mmol/L 133Low     Potassium 3.5 - 5.1 mmol/L 3.7    Chloride 95 - 110 mmol/L 97    CO2 23 - 29 mmol/L 27    Glucose 70 - 110 mg/dL 126High     BUN 7 - 17 mg/dL 10    Creatinine 0.50 - 1.40 mg/dL 0.65  "   Calcium 8.7 - 10.5 mg/dL 9.6    Total Protein 6.0 - 8.4 g/dL 7.2    Albumin 3.5 - 5.2 g/dL 4.3    Total Bilirubin 0.1 - 1.0 mg/dL 0.5    Comment: For infants and newborns, interpretation of results should be based   on gestational age, weight and in agreement with clinical   observations.   Premature Infant recommended reference ranges:   Up to 24 hours.............<8.0 mg/dL   Up to 48 hours............<12.0 mg/dL   3-5 days..................<15.0 mg/dL   6-29 days.................<15.0 mg/dL    Alkaline Phosphatase 38 - 126 U/L 84    AST 15 - 46 U/L 19    ALT 10 - 44 U/L 10    Anion Gap 8 - 16 mmol/L 9    eGFR if African American >60 mL/min/1.73 m^2 >60.0    eGFR if non African American >60 mL/min/1.73 m^2 >60.0    Comment: Calculation used to obtain the estimated glomerular filtration   rate (eGFR) is the CKD-EPI equation.    Resulting Agency  [facility]     WBC 3.90 - 12.70 K/uL 7.45    RBC 4.00 - 5.40 M/uL 4.17    Hemoglobin 12.0 - 16.0 g/dL 12.6    Hematocrit 37.0 - 48.5 % 37.2    MCV 82 - 98 fL 89    MCH 27.0 - 31.0 pg 30.2    MCHC 32.0 - 36.0 g/dL 33.9    RDW 11.5 - 14.5 % 11.0Low     Platelets 150 - 350 K/uL 261    MPV 9.2 - 12.9 fL 8.7Low     Immature Granulocytes 0.0 - 0.5 % 0.3    Gran # (ANC) 1.8 - 7.7 K/uL 5.0    Immature Grans (Abs) 0.00 - 0.04 K/uL 0.02    Comment: Mild elevation in immature granulocytes is non specific and   can be seen in a variety of conditions including stress response,   acute inflammation, trauma and pregnancy. Correlation with other   laboratory and clinical findings is essential.    Lymph # 1.0 - 4.8 K/uL 1.5    Mono # 0.3 - 1.0 K/uL 0.8    Eos # 0.0 - 0.5 K/uL 0.1    Baso # 0.00 - 0.20 K/uL 0.02    nRBC 0 /100 WBC 0    Gran % 38.0 - 73.0 % 67.1    Lymph % 18.0 - 48.0 % 20.5    Mono % 4.0 - 15.0 % 11.1    Eosinophil % 0.0 - 8.0 % 0.7    Basophil % 0.0 - 1.9 % 0.3    Differential Method  Automated           Microbiology Data: None       Summary of Chest Imaging Personally  Reviewed:     CT Chest 10/2020- No central pulmonary thromboemboli.     Focus of pleural based consolidation in the peripheral posterolateral right upper lobe.  And underlying nodular lesion is not reliably excluded and follow-up is recommended with CT to ensure resolution in the short-term.  Very small peripheral thromboemboli within the consolidation are difficult to reliably exclude.  There are no more central emboli to suggest the area of consolidation is related to pulmonary embolism.  Correlate clinically for pneumonia.     No adenopathy, effusion or cardiomegaly.  Small lymph node in the right hilum measures 9 mm maximally .     Focus of pleural-based nodular scarring in the right apex is essentially stable in size since the previous examination 11/04/2019.  No new nodular suspicious findings.  Follow-up CT could also ensure stability of the apical areas of suspected nodular scarring.     CXR 10/26/2020- There is centrilobular emphysema.  There is a large spiculated density within the right mid to upper lung zone as seen on chest CT performed 10/19/2020 which appears unchanged.  This is concerning for infection cannot exclude mass lesion.  There is no pneumothorax.  The remainder of the examination appears unchanged.    CT Chest 11/2019-    Extensive bilateral apical pleural thickening and/or scar including a 3.2 x 0.8 cm pleural based density at the right lung apex.    2D Echo:   None     PFT's: None       Assessment:       No diagnosis found.    Outpatient Encounter Medications as of 11/11/2020   Medication Sig Dispense Refill    albuterol (ACCUNEB) 1.25 mg/3 mL Nebu Take 3 mLs (1.25 mg total) by nebulization every 6 (six) hours as needed. Rescue 3 Box 3    albuterol-ipratropium (DUO-NEB) 2.5 mg-0.5 mg/3 mL nebulizer solution Take 3 mLs by nebulization every 6 (six) hours as needed for Wheezing. Rescue 1 Box 0    azithromycin (Z-MONA) 250 MG tablet Take 1 tablet (250 mg total) by mouth once daily. Take  first 2 tablets together, then 1 every day until finished. 6 tablet 0    BREO ELLIPTA 200-25 mcg/dose DsDv diskus inhaler INHALE 1 PUFF INTO THE LUNGS ONCE DAILY 180 each 3    cyclobenzaprine (FLEXERIL) 5 MG tablet Take 5 mg by mouth 3 (three) times daily as needed for Muscle spasms.      DEXILANT 60 mg capsule       eletriptan (RELPAX) 40 MG tablet Take 1 tablet (40 mg total) by mouth as needed (migraine). Can take a second dose if symptoms not improved 2 hours after first dose. 80mg maximum daily dose 30 tablet 1    erenumab-aooe (AIMOVIG AUTOINJECTOR) 70 mg/mL autoinjector Inject 1 mL (70 mg total) into the skin every 30 days. 1 mL 11    INCRUSE ELLIPTA 62.5 mcg/actuation inhalation capsule INHALE 1 CAPSULE (63 MCG TOTAL) INTO THE LUNGS ONCE DAILY. 90 each 3    meloxicam (MOBIC) 15 MG tablet Take 1 tablet (15 mg total) by mouth once daily. 90 tablet 3    montelukast (SINGULAIR) 10 mg tablet Take 10 mg by mouth every evening.      MOTEGRITY 2 mg Tab Take 2 mg by mouth once daily. 90 tablet 3    oxyCODONE-acetaminophen (PERCOCET)  mg per tablet Take 1 tablet by mouth 3 (three) times daily as needed for Pain (Migraines). 21 tablet 0    temazepam (RESTORIL) 15 mg Cap Take 1 capsule (15 mg total) by mouth every evening. 30 capsule 1     No facility-administered encounter medications on file as of 11/11/2020.      No orders of the defined types were placed in this encounter.      Plan:       1. Abnormal CT chest- Significant emphysematous changes with stable bi-apical scarring, which is stable dating back to 2019. New RUL posterior segment opacity identified in setting of COVID PNA> Does have some reflux features as well and on multiple sedating medications raising aspiration concern... Now afebrile and cough improved. Still with persistent fatigue and intermittent migratory chest pain.. Given tobacco use findings needs close attention.. Hard to know if PNA vs. Aspiration vs. Possible malignancy..      -- Repeat CT chest in 4-6 weeks. If persistant plan for biopsy.  -- Course of augmentin as azithromycin not adequate for aspiration..      2. COPD- GOLD B/C. Previous hospitalizations in [place].. Only recent flare related to COVID. Now at baseline. MMRC 2-3. Still smoking     -- No need for supplemental O2.  -- Continue LABA/LAMA/ICS   -- Smoking cessation- See below.   -- Not candidate for pulmonary rehab   -- Maintain vaccinations   -- Increase exercise     2. Tobacco dependency- Start chantix. Unable to follow up with tobacco cessation 2/2 demands caring for special needs daughter..       Will call once CT completed. Call in the interim if any change.       José Emerson M.D.   [facility]

## 2020-11-11 ENCOUNTER — OFFICE VISIT (OUTPATIENT)
Dept: PULMONOLOGY | Facility: CLINIC | Age: 60
End: 2020-11-11
Payer: COMMERCIAL

## 2020-11-11 VITALS
WEIGHT: 110 LBS | BODY MASS INDEX: 20.24 KG/M2 | DIASTOLIC BLOOD PRESSURE: 61 MMHG | HEIGHT: 62 IN | OXYGEN SATURATION: 98 % | HEART RATE: 74 BPM | SYSTOLIC BLOOD PRESSURE: 100 MMHG

## 2020-11-11 DIAGNOSIS — R93.89 ABNORMAL CT OF THE CHEST: ICD-10-CM

## 2020-11-11 DIAGNOSIS — J44.9 CHRONIC OBSTRUCTIVE PULMONARY DISEASE, UNSPECIFIED COPD TYPE: ICD-10-CM

## 2020-11-11 PROCEDURE — 99999 PR PBB SHADOW E&M-EST. PATIENT-LVL IV: CPT | Mod: PBBFAC,,, | Performed by: EMERGENCY MEDICINE

## 2020-11-11 PROCEDURE — 99204 OFFICE O/P NEW MOD 45 MIN: CPT | Mod: S$GLB,,, | Performed by: EMERGENCY MEDICINE

## 2020-11-11 PROCEDURE — 99204 PR OFFICE/OUTPT VISIT, NEW, LEVL IV, 45-59 MIN: ICD-10-PCS | Mod: S$GLB,,, | Performed by: EMERGENCY MEDICINE

## 2020-11-11 PROCEDURE — 3008F BODY MASS INDEX DOCD: CPT | Mod: CPTII,S$GLB,, | Performed by: EMERGENCY MEDICINE

## 2020-11-11 PROCEDURE — 3008F PR BODY MASS INDEX (BMI) DOCUMENTED: ICD-10-PCS | Mod: CPTII,S$GLB,, | Performed by: EMERGENCY MEDICINE

## 2020-11-11 PROCEDURE — 99999 PR PBB SHADOW E&M-EST. PATIENT-LVL IV: ICD-10-PCS | Mod: PBBFAC,,, | Performed by: EMERGENCY MEDICINE

## 2020-11-11 RX ORDER — PANTOPRAZOLE SODIUM 40 MG/1
40 TABLET, DELAYED RELEASE ORAL DAILY
Qty: 30 TABLET | Refills: 11 | Status: SHIPPED | OUTPATIENT
Start: 2020-11-11 | End: 2021-11-11

## 2020-11-11 RX ORDER — VARENICLINE TARTRATE 0.5 (11)-1
KIT ORAL
Qty: 1 PACKAGE | Refills: 0 | Status: SHIPPED | OUTPATIENT
Start: 2020-11-11

## 2020-11-11 RX ORDER — AMOXICILLIN AND CLAVULANATE POTASSIUM 875; 125 MG/1; MG/1
1 TABLET, FILM COATED ORAL EVERY 12 HOURS
Qty: 14 TABLET | Refills: 0 | Status: SHIPPED | OUTPATIENT
Start: 2020-11-11 | End: 2020-11-18

## 2020-11-11 NOTE — LETTER
November 11, 2020      Agus Hargrove MD  [address]  [facility] [address]           [facility] [address]  [address]  [facility] [address]  Phone: [phone]          Patient: Shavon Marrero   MR Number: [id number]   YOB: 1960   Date of Visit: 11/11/2020       Dear Dr. Agus Hargrove:    Thank you for referring Shavon Marrero to me for evaluation. Attached you will find relevant portions of my assessment and plan of care.    If you have questions, please do not hesitate to call me. I look forward to following Shavon Marrero along with you.    Sincerely,    José Emerson MD    Enclosure  CC:  No Recipients    If you would like to receive this communication electronically, please contact [email or web address] or [phone] to request more information on [facility] [facility] access.    For providers and/or their staff who would like to refer a patient to [facility], please contact us through our one-stop-shop provider referral line, [facility], at [phone].    If you feel you have received this communication in error or would no longer like to receive these types of communications, please e-mail [email or web address]

## 2020-11-12 ENCOUNTER — PATIENT MESSAGE (OUTPATIENT)
Dept: INTERNAL MEDICINE | Facility: CLINIC | Age: 60
End: 2020-11-12

## 2020-11-12 DIAGNOSIS — J44.9 CHRONIC OBSTRUCTIVE PULMONARY DISEASE, UNSPECIFIED COPD TYPE: ICD-10-CM

## 2020-11-19 ENCOUNTER — PATIENT MESSAGE (OUTPATIENT)
Dept: PULMONOLOGY | Facility: CLINIC | Age: 60
End: 2020-11-19

## 2020-11-27 ENCOUNTER — PATIENT MESSAGE (OUTPATIENT)
Dept: INTERNAL MEDICINE | Facility: CLINIC | Age: 60
End: 2020-11-27

## 2020-11-27 DIAGNOSIS — B37.9 ANTIBIOTIC-INDUCED YEAST INFECTION: Primary | ICD-10-CM

## 2020-11-27 DIAGNOSIS — T36.95XA ANTIBIOTIC-INDUCED YEAST INFECTION: Primary | ICD-10-CM

## 2020-12-01 RX ORDER — FLUCONAZOLE 150 MG/1
150 TABLET ORAL DAILY
Qty: 1 TABLET | Refills: 0 | Status: SHIPPED | OUTPATIENT
Start: 2020-12-01 | End: 2020-12-02

## 2020-12-11 ENCOUNTER — PATIENT MESSAGE (OUTPATIENT)
Dept: PULMONOLOGY | Facility: CLINIC | Age: 60
End: 2020-12-11

## 2020-12-14 DIAGNOSIS — R93.89 ABNORMAL CT OF THE CHEST: Primary | ICD-10-CM

## 2020-12-14 DIAGNOSIS — J18.1 CONSOLIDATION OF RIGHT LOWER LOBE OF LUNG: ICD-10-CM

## 2020-12-14 NOTE — PROGRESS NOTES
Chest CT reviewed. Abnormal RUL opacity is improving with some partial clearance. Discussed with her. Given extensive tobacco use will follow to ensure resolution. Plan for CT imaging at 3 months. She has some concerns regarding financial clearance. Will reach out to Ins company. Not sure why this is not covered.

## 2020-12-18 ENCOUNTER — PATIENT MESSAGE (OUTPATIENT)
Dept: PULMONOLOGY | Facility: CLINIC | Age: 60
End: 2020-12-18

## 2021-01-04 ENCOUNTER — PATIENT MESSAGE (OUTPATIENT)
Dept: ADMINISTRATIVE | Facility: HOSPITAL | Age: 61
End: 2021-01-04

## 2021-04-05 ENCOUNTER — PATIENT MESSAGE (OUTPATIENT)
Dept: ADMINISTRATIVE | Facility: HOSPITAL | Age: 61
End: 2021-04-05

## 2021-06-04 ENCOUNTER — PATIENT MESSAGE (OUTPATIENT)
Dept: PULMONOLOGY | Facility: CLINIC | Age: 61
End: 2021-06-04

## 2021-07-06 ENCOUNTER — PATIENT MESSAGE (OUTPATIENT)
Dept: ADMINISTRATIVE | Facility: HOSPITAL | Age: 61
End: 2021-07-06

## 2021-10-04 ENCOUNTER — PATIENT MESSAGE (OUTPATIENT)
Dept: ADMINISTRATIVE | Facility: HOSPITAL | Age: 61
End: 2021-10-04

## 2021-10-13 DIAGNOSIS — Z12.31 OTHER SCREENING MAMMOGRAM: ICD-10-CM

## 2022-01-11 RX ORDER — PRUCALOPRIDE 2 MG/1
TABLET, FILM COATED ORAL
Qty: 30 TABLET | Refills: 26 | Status: SHIPPED | OUTPATIENT
Start: 2022-01-11

## 2022-01-19 ENCOUNTER — PATIENT MESSAGE (OUTPATIENT)
Dept: ADMINISTRATIVE | Facility: HOSPITAL | Age: 62
End: 2022-01-19
Payer: COMMERCIAL

## 2022-03-16 ENCOUNTER — PATIENT MESSAGE (OUTPATIENT)
Dept: ADMINISTRATIVE | Facility: HOSPITAL | Age: 62
End: 2022-03-16
Payer: COMMERCIAL

## 2022-07-13 ENCOUNTER — PATIENT MESSAGE (OUTPATIENT)
Dept: ADMINISTRATIVE | Facility: HOSPITAL | Age: 62
End: 2022-07-13
Payer: COMMERCIAL

## 2023-03-16 ENCOUNTER — PATIENT OUTREACH (OUTPATIENT)
Dept: ADMINISTRATIVE | Facility: HOSPITAL | Age: 63
End: 2023-03-16
Payer: COMMERCIAL

## 2023-03-16 NOTE — PROGRESS NOTES
Pt moved , established care in [place] 2021    Nova Woods LPN   Clinical Care Coordinator  Primary Care and Wellness